# Patient Record
Sex: FEMALE | Race: BLACK OR AFRICAN AMERICAN | NOT HISPANIC OR LATINO | Employment: FULL TIME | URBAN - METROPOLITAN AREA
[De-identification: names, ages, dates, MRNs, and addresses within clinical notes are randomized per-mention and may not be internally consistent; named-entity substitution may affect disease eponyms.]

---

## 2022-02-02 ENCOUNTER — OFFICE VISIT (OUTPATIENT)
Dept: URGENT CARE | Facility: URGENT CARE | Age: 32
End: 2022-02-02
Payer: COMMERCIAL

## 2022-02-02 VITALS
OXYGEN SATURATION: 100 % | TEMPERATURE: 98.3 F | HEART RATE: 77 BPM | DIASTOLIC BLOOD PRESSURE: 79 MMHG | SYSTOLIC BLOOD PRESSURE: 117 MMHG

## 2022-02-02 DIAGNOSIS — L98.9 SKIN LESION: ICD-10-CM

## 2022-02-02 DIAGNOSIS — K64.4 EXTERNAL HEMORRHOIDS: Primary | ICD-10-CM

## 2022-02-02 PROCEDURE — 99203 OFFICE O/P NEW LOW 30 MIN: CPT | Performed by: PHYSICIAN ASSISTANT

## 2022-02-02 RX ORDER — HYDROCORTISONE ACETATE 25 MG/1
25 SUPPOSITORY RECTAL 2 TIMES DAILY PRN
Qty: 12 SUPPOSITORY | Refills: 0 | Status: SHIPPED | OUTPATIENT
Start: 2022-02-02 | End: 2023-05-30

## 2022-02-02 NOTE — PROGRESS NOTES
"Assessment & Plan     1. External hemorrhoids  Along with internal noted on exam.   Will treat with anusol  Encouraged high fiber diet and avoid constipation  If not seeing improvement, advised colorectal consult and referral was placed   - Colorectal Surgery Referral; Future  - hydrocortisone (ANUSOL-HC) 25 MG suppository; Place 1 suppository (25 mg) rectally 2 times daily as needed for hemorrhoids  Dispense: 12 suppository; Refill: 0    2. Skin lesion  Skin lesion for the past 1-2 months, growing in size. Asymptomatic.   Advised follow-up with PCP at gen physical, patient requests derm referral for concrete diagnosis. Referral placed   - Adult Dermatology Referral; Future        Return in about 2 weeks (around 2/16/2022), or if symptoms worsen or fail to improve.    Diagnosis and treatment plan was reviewed with patient and/or family.   We went over any labs or imaging. Discussed worsening symptoms or little to no relief despite treatment plan to follow-up with PCP or return to clinic.  Patient verbalizes understanding. All questions were addressed and answered.     Angelica Canales PA-C  [facility]    CHIEF COMPLAINT:   Chief Complaint   Patient presents with     Urgent Care     Rectal Problem     hemorrhoids since July     Ricarda Bravo is a 31 year old female who presents to clinic today for evaluation of rectal problem. Patient reports that this summer after an episode of constipation, she developed hemorrhoids. Initially, she did notice bright red blood on the toilet paper, but has not seen any recently. Has pain during bowel movements and feels a \"bulge\" down there.     Additionally, since Dec 21, has noticed lightening of her skin of vulva. Asymptomatic without pain, or rash. Area continues to spread. She does not have similar rash on any other part of her body.       No past medical history on file.  No past surgical history on file.  Social History     Tobacco Use "     Smoking status: Not on file     Smokeless tobacco: Not on file   Substance Use Topics     Alcohol use: Not on file     Current Outpatient Medications   Medication     hydrocortisone (ANUSOL-HC) 25 MG suppository     No current facility-administered medications for this visit.     No Known Allergies    10 point ROS of systems were all negative except for pertinent positives noted in my HPI.      Exam:   /79   Pulse 77   Temp 98.3  F (36.8  C) (Tympanic)   SpO2 100%   Breastfeeding No   Constitutional: healthy, alert and no distress  : Labia has a discolored portion on both right and left side. No lesion, vesicles, thickening or excoriation noted.   Rectal: Internal and external hemorrhoid noted with skin tag externally   Neurologic: Speech clear, gait normal. Moves all extremities.

## 2022-02-02 NOTE — PATIENT INSTRUCTIONS
RX to help for the discomfort of hemorrhoids, use as needed   Keep stools soft by having a high fiber diet and using stool softener if constipated  Referral to colorectal specialist if medication does not help for Hemorrhoids  Bring up skin discoloration of vulva at PCP appointment.     Patient Education     Hemorrhoids    Hemorrhoids are swollen and inflamed veins inside the rectum and near the anus. The rectum is the last several inches of the colon. The anus is the passage between the rectum and the outside of the body.   Causes  The veins can become swollen due to increased pressure in them. This is most often caused by:     Chronic constipation or diarrhea    Straining when having a bowel movement    Sitting too long on the toilet    A low-fiber diet    Pregnancy  Symptoms    Bleeding from the rectum. You may notice this after bowel movements.    Lump near the anus    Itching around the anus    Pain around the anus    Mucus leaks from the anus  There are different types of hemorrhoids. Depending on the type you have and the severity, you may be able to treat yourself at home. In some cases, a procedure may be the best treatment option. Your healthcare provider can tell you more about this, if needed.   Home care  General care    To get relief from pain or itching, try:  ? Medicines. Your healthcare provider may recommend stool softeners, suppositories, or laxatives to help manage constipation. Use these exactly as directed.  ? Sitz baths. A sitz bath involves sitting in a few inches of warm bath water. Be careful not to make the water so hot that you burn yourself--test it before sitting in it. Soak for about 10 to 15 minutes a few times a day. This may help relieve pain.  ? Topical products. Your healthcare provider may prescribe or recommend creams, ointments, or pads that can be applied to the hemorrhoid. Use these exactly as directed.  Tips to help prevent hemorrhoids     Eat more fiber. Fiber adds bulk to  stool and absorbs water as it moves through your colon. This makes stool softer and easier to pass.  ? Increase the fiber in your diet with more fiber-rich foods. These include fresh fruit, vegetables, and whole grains.  ? Take a fiber supplement or bulking agent, if advised by your healthcare provider. These include products such as psyllium or methylcellulose.    Drink more water. Your healthcare provider may direct you to drink plenty of water. This can help keep stool soft.    Be more active. Frequent exercise aids digestion and helps prevent constipation. It may also help make bowel movements more regular.    Don t strain during bowel movements. This can make hemorrhoids more likely. Also, don t sit on the toilet for long periods of time.    Follow-up care  Follow up with your healthcare provider as advised. If a culture or imaging tests were done, someone will let you know the results when they are ready. This may take a few days or longer. If your healthcare provider recommends a procedure for your hemorrhoids, these options can be discussed. Options may include surgery and outpatient office treatments.   When to seek medical advice  Call your healthcare provider right away if any of these occur:     Increased bleeding from the rectum    Increased pain around the rectum or anus    Weakness or dizziness  Call 911  Call 911 if any of these occur:     Trouble breathing or swallowing    Fainting or loss of consciousness    Unusually fast heart rate    Vomiting blood    Large amounts of blood in stool or black, tarry stools  Saida last reviewed this educational content on 8/1/2019 2000-2021 [employer]. All rights reserved. This information is not intended as a substitute for professional medical care. Always follow your healthcare professional's instructions.

## 2023-05-28 PROBLEM — O34.211 MATERNAL CARE DUE TO LOW TRANSVERSE UTERINE SCAR FROM PREVIOUS CESAREAN DELIVERY: Status: ACTIVE | Noted: 2019-10-28

## 2023-05-28 PROBLEM — Z97.5 IUD (INTRAUTERINE DEVICE) IN PLACE: Status: ACTIVE | Noted: 2020-01-20

## 2023-05-28 PROBLEM — O34.219 VBAC (VAGINAL BIRTH AFTER CESAREAN): Status: ACTIVE | Noted: 2019-11-14

## 2023-05-30 ENCOUNTER — OFFICE VISIT (OUTPATIENT)
Dept: FAMILY MEDICINE | Facility: CLINIC | Age: 33
End: 2023-05-30
Payer: COMMERCIAL

## 2023-05-30 VITALS
TEMPERATURE: 98.3 F | RESPIRATION RATE: 14 BRPM | BODY MASS INDEX: 27.82 KG/M2 | WEIGHT: 167 LBS | HEART RATE: 81 BPM | SYSTOLIC BLOOD PRESSURE: 116 MMHG | HEIGHT: 65 IN | OXYGEN SATURATION: 99 % | DIASTOLIC BLOOD PRESSURE: 62 MMHG

## 2023-05-30 DIAGNOSIS — D64.9 ANEMIA, UNSPECIFIED TYPE: ICD-10-CM

## 2023-05-30 DIAGNOSIS — Z00.00 ANNUAL PHYSICAL EXAM: Primary | ICD-10-CM

## 2023-05-30 DIAGNOSIS — Z12.4 SCREENING FOR MALIGNANT NEOPLASM OF CERVIX: ICD-10-CM

## 2023-05-30 DIAGNOSIS — Z12.4 CERVICAL CANCER SCREENING: ICD-10-CM

## 2023-05-30 DIAGNOSIS — Z13.220 SCREENING FOR HYPERLIPIDEMIA: ICD-10-CM

## 2023-05-30 LAB
ERYTHROCYTE [DISTWIDTH] IN BLOOD BY AUTOMATED COUNT: 13.9 % (ref 10–15)
HCT VFR BLD AUTO: 34.6 % (ref 35–47)
HGB BLD-MCNC: 11.3 G/DL (ref 11.7–15.7)
MCH RBC QN AUTO: 27.3 PG (ref 26.5–33)
MCHC RBC AUTO-ENTMCNC: 32.7 G/DL (ref 31.5–36.5)
MCV RBC AUTO: 84 FL (ref 78–100)
PLATELET # BLD AUTO: 335 10E3/UL (ref 150–450)
RBC # BLD AUTO: 4.14 10E6/UL (ref 3.8–5.2)
WBC # BLD AUTO: 6.8 10E3/UL (ref 4–11)

## 2023-05-30 PROCEDURE — 87624 HPV HI-RISK TYP POOLED RSLT: CPT | Performed by: PHYSICIAN ASSISTANT

## 2023-05-30 PROCEDURE — 83540 ASSAY OF IRON: CPT | Performed by: PHYSICIAN ASSISTANT

## 2023-05-30 PROCEDURE — 85027 COMPLETE CBC AUTOMATED: CPT | Performed by: PHYSICIAN ASSISTANT

## 2023-05-30 PROCEDURE — 80061 LIPID PANEL: CPT | Performed by: PHYSICIAN ASSISTANT

## 2023-05-30 PROCEDURE — G0145 SCR C/V CYTO,THINLAYER,RESCR: HCPCS | Performed by: PHYSICIAN ASSISTANT

## 2023-05-30 PROCEDURE — 99395 PREV VISIT EST AGE 18-39: CPT | Performed by: PHYSICIAN ASSISTANT

## 2023-05-30 PROCEDURE — 82728 ASSAY OF FERRITIN: CPT | Performed by: PHYSICIAN ASSISTANT

## 2023-05-30 PROCEDURE — 83550 IRON BINDING TEST: CPT | Performed by: PHYSICIAN ASSISTANT

## 2023-05-30 PROCEDURE — 36415 COLL VENOUS BLD VENIPUNCTURE: CPT | Performed by: PHYSICIAN ASSISTANT

## 2023-05-30 PROCEDURE — 80053 COMPREHEN METABOLIC PANEL: CPT | Performed by: PHYSICIAN ASSISTANT

## 2023-05-30 ASSESSMENT — ENCOUNTER SYMPTOMS
NAUSEA: 0
HEMATOCHEZIA: 0
SHORTNESS OF BREATH: 0
ABDOMINAL PAIN: 0
PARESTHESIAS: 0
MYALGIAS: 0
FEVER: 0
COUGH: 0
JOINT SWELLING: 0
DIZZINESS: 0
CONSTIPATION: 0
FREQUENCY: 0
WEAKNESS: 0
SORE THROAT: 0
BREAST MASS: 0
EYE PAIN: 0
CHILLS: 0
NERVOUS/ANXIOUS: 0
DYSURIA: 0
HEARTBURN: 0
HEMATURIA: 0
PALPITATIONS: 0
HEADACHES: 0
ARTHRALGIAS: 0
DIARRHEA: 0

## 2023-05-30 NOTE — PROGRESS NOTES
SUBJECTIVE:   CC: Lawrence is an 32 year old who presents for preventive health visit.       5/30/2023     4:12 PM   Additional Questions   Roomed by Evelyne Issa   Accompanied by none     Patient has been advised of split billing requirements and indicates understanding: Yes  Patient is a pleasant 32-year-old female who presents to the clinic today for annual physical.  No significant past medical history.  She is not on chronic daily medication.  No concerns about anxiety or depression.  She is not a current smoker.  No excessive alcohol use.  She is staying active.    Healthy Habits:     Getting at least 3 servings of Calcium per day:  Yes    Bi-annual eye exam:  NO    Dental care twice a year:  Yes    Sleep apnea or symptoms of sleep apnea:  None    Diet:  Other    Frequency of exercise:  6-7 days/week    Duration of exercise:  30-45 minutes    Taking medications regularly:  Not Applicable    Medication side effects:  Not applicable    PHQ-2 Total Score: 0    Additional concerns today:  No      Today's PHQ-2 Score:       5/30/2023     4:15 PM   PHQ-2 ( 1999 [employer])   Q1: Little interest or pleasure in doing things 0   Q2: Feeling down, depressed or hopeless 0   PHQ-2 Score 0   Q1: Little interest or pleasure in doing things Not at all   Q2: Feeling down, depressed or hopeless Not at all   PHQ-2 Score 0       Have you ever done Advance Care Planning? (For example, a Health Directive, POLST, or a discussion with a medical provider or your loved ones about your wishes): No, advance care planning information given to patient to review.  Patient declined advance care planning discussion at this time.    Social History     Tobacco Use     Smoking status: Never     Passive exposure: Never     Smokeless tobacco: Never   Vaping Use     Vaping status: Never Used   Substance Use Topics     Alcohol use: Not on file             5/30/2023     4:10 PM   Alcohol Use   Prescreen: >3 drinks/day or >7 drinks/week? Not  Applicable     Reviewed orders with patient.  Reviewed health maintenance and updated orders accordingly - Yes  Lab work is in process    Breast Cancer Screenin/30/2023     4:15 PM   Breast CA Risk Assessment (FHS-7)   Do you have a family history of breast, colon, or ovarian cancer? No / Unknown       click delete button to remove this line now  Patient under 40 years of age: Routine Mammogram Screening not recommended.   Pertinent mammograms are reviewed under the imaging tab.    History of abnormal Pap smear: NO - age 30-65 PAP every 5 years with negative HPV co-testing recommended     Reviewed and updated as needed this visit by clinical staff   Tobacco  Allergies  Meds              Reviewed and updated as needed this visit by Provider                 No past medical history on file.   No past surgical history on file.  OB History   No obstetric history on file.       Review of Systems   Constitutional: Negative for chills and fever.   HENT: Negative for congestion, ear pain, hearing loss and sore throat.    Eyes: Negative for pain and visual disturbance.   Respiratory: Negative for cough and shortness of breath.    Cardiovascular: Negative for chest pain, palpitations and peripheral edema.   Gastrointestinal: Negative for abdominal pain, constipation, diarrhea, heartburn, hematochezia and nausea.   Breasts:  Negative for tenderness, breast mass and discharge.   Genitourinary: Negative for dysuria, frequency, genital sores, hematuria, pelvic pain, urgency, vaginal bleeding and vaginal discharge.   Musculoskeletal: Negative for arthralgias, joint swelling and myalgias.   Skin: Negative for rash.   Neurological: Negative for dizziness, weakness, headaches and paresthesias.   Psychiatric/Behavioral: Negative for mood changes. The patient is not nervous/anxious.         OBJECTIVE:   /62 (BP Location: Right arm, Patient Position: Sitting, Cuff Size: Adult Regular)   Pulse 81   Temp 98.3  F (36.8  " C) (Oral)   Resp 14   Ht 1.653 m (5' 5.06\")   Wt 75.8 kg (167 lb)   LMP 05/16/2023 (Approximate)   SpO2 99%   BMI 27.74 kg/m    Physical Exam  Vitals and nursing note reviewed. Exam conducted with a chaperone present.   Constitutional:       General: She is not in acute distress.     Appearance: Normal appearance. She is well-developed and well-groomed. She is not ill-appearing or toxic-appearing.   HENT:      Head: Normocephalic and atraumatic.      Right Ear: Tympanic membrane, ear canal and external ear normal.      Left Ear: Tympanic membrane, ear canal and external ear normal.      Mouth/Throat:      Lips: Pink.      Mouth: Mucous membranes are moist.      Palate: No mass.      Pharynx: Oropharynx is clear. Uvula midline.      Tonsils: No tonsillar exudate or tonsillar abscesses.   Eyes:      General: Lids are normal.         Right eye: No discharge.         Left eye: No discharge.   Neck:      Trachea: Trachea normal.   Cardiovascular:      Rate and Rhythm: Normal rate and regular rhythm.      Heart sounds: S1 normal and S2 normal. No murmur heard.  Pulmonary:      Effort: Pulmonary effort is normal.      Breath sounds: Normal breath sounds and air entry.   Abdominal:      General: Bowel sounds are normal. There is no distension.      Palpations: Abdomen is soft.      Tenderness: There is no abdominal tenderness. There is no guarding or rebound.   Genitourinary:     Vagina: Normal.      Cervix: Normal.      Uterus: Normal.       Adnexa: Right adnexa normal and left adnexa normal.      Comments: Exam chaperoned by Evelyne SAAB  Musculoskeletal:      Cervical back: Full passive range of motion without pain and neck supple.      Right lower leg: No edema.      Left lower leg: No edema.   Lymphadenopathy:      Cervical: No cervical adenopathy.   Skin:     General: Skin is warm and dry.      Findings: No lesion or rash.   Neurological:      General: No focal deficit present.      Mental Status: She is alert.    " "  Cranial Nerves: No cranial nerve deficit.      Gait: Gait is intact.      Deep Tendon Reflexes:      Reflex Scores:       Patellar reflexes are 2+ on the right side and 2+ on the left side.  Psychiatric:         Attention and Perception: Attention normal.         Mood and Affect: Mood normal.         Speech: Speech normal.           ASSESSMENT/PLAN:       ICD-10-CM    1. Annual physical exam  Z00.00 CBC with platelets     Comprehensive metabolic panel (BMP + Alb, Alk Phos, ALT, AST, Total. Bili, TP)     Lipid panel reflex to direct LDL Fasting     CBC with platelets     Comprehensive metabolic panel (BMP + Alb, Alk Phos, ALT, AST, Total. Bili, TP)     Lipid panel reflex to direct LDL Fasting      2. Cervical cancer screening  Z12.4       3. Screening for malignant neoplasm of cervix  Z12.4 Gynecologic Cytology (PAP Smear)     Gynecologic Cytology (PAP Smear)      4. Screening for hyperlipidemia  Z13.220       5. Anemia, unspecified type  D64.9 Iron and iron binding capacity     Ferritin        #1 annual physical  Update screening labs including a CBC, complete metabolic panel, lipid    #2 screening for hyperlipidemia  We will check a lipid panel today    #3 screening for cervical cancer   Pap updated today    Patient has been advised of split billing requirements and indicates understanding: Yes      COUNSELING:  Reviewed preventive health counseling, as reflected in patient instructions       Regular exercise       Healthy diet/nutrition       Vision screening      BMI:   Estimated body mass index is 27.74 kg/m  as calculated from the following:    Height as of this encounter: 1.653 m (5' 5.06\").    Weight as of this encounter: 75.8 kg (167 lb).   Weight management plan: Discussed healthy diet and exercise guidelines      She reports that she has never smoked. She has never been exposed to tobacco smoke. She has never used smokeless tobacco.            Jonathan Hart PA-C  [facility] " SARAH

## 2023-05-31 LAB
ALBUMIN SERPL BCG-MCNC: 4.1 G/DL (ref 3.5–5.2)
ALP SERPL-CCNC: 55 U/L (ref 35–104)
ALT SERPL W P-5'-P-CCNC: 18 U/L (ref 10–35)
ANION GAP SERPL CALCULATED.3IONS-SCNC: 12 MMOL/L (ref 7–15)
AST SERPL W P-5'-P-CCNC: 23 U/L (ref 10–35)
BILIRUB SERPL-MCNC: 0.6 MG/DL
BUN SERPL-MCNC: 12 MG/DL (ref 6–20)
CALCIUM SERPL-MCNC: 9 MG/DL (ref 8.6–10)
CHLORIDE SERPL-SCNC: 107 MMOL/L (ref 98–107)
CHOLEST SERPL-MCNC: 164 MG/DL
CREAT SERPL-MCNC: 0.59 MG/DL (ref 0.51–0.95)
DEPRECATED HCO3 PLAS-SCNC: 20 MMOL/L (ref 22–29)
GFR SERPL CREATININE-BSD FRML MDRD: >90 ML/MIN/1.73M2
GLUCOSE SERPL-MCNC: 81 MG/DL (ref 70–99)
HDLC SERPL-MCNC: 58 MG/DL
LDLC SERPL CALC-MCNC: 95 MG/DL
NONHDLC SERPL-MCNC: 106 MG/DL
POTASSIUM SERPL-SCNC: 3.9 MMOL/L (ref 3.4–5.3)
PROT SERPL-MCNC: 7.8 G/DL (ref 6.4–8.3)
SODIUM SERPL-SCNC: 139 MMOL/L (ref 136–145)
TRIGL SERPL-MCNC: 57 MG/DL

## 2023-06-01 LAB
FERRITIN SERPL-MCNC: 11 NG/ML (ref 6–175)
IRON BINDING CAPACITY (ROCHE): 331 UG/DL (ref 240–430)
IRON SATN MFR SERPL: 14 % (ref 15–46)
IRON SERPL-MCNC: 45 UG/DL (ref 37–145)

## 2023-06-02 LAB
BKR LAB AP GYN ADEQUACY: NORMAL
BKR LAB AP GYN INTERPRETATION: NORMAL
BKR LAB AP GYN OTHER FINDINGS: NORMAL
BKR LAB AP HPV REFLEX: NORMAL
BKR LAB AP LMP: NORMAL
BKR LAB AP PREVIOUS ABNORMAL: NORMAL
PATH REPORT.COMMENTS IMP SPEC: NORMAL
PATH REPORT.COMMENTS IMP SPEC: NORMAL
PATH REPORT.RELEVANT HX SPEC: NORMAL

## 2023-06-05 LAB
HUMAN PAPILLOMA VIRUS 16 DNA: NEGATIVE
HUMAN PAPILLOMA VIRUS 18 DNA: NEGATIVE
HUMAN PAPILLOMA VIRUS FINAL DIAGNOSIS: ABNORMAL
HUMAN PAPILLOMA VIRUS OTHER HR: POSITIVE

## 2023-06-06 ENCOUNTER — PATIENT OUTREACH (OUTPATIENT)
Dept: FAMILY MEDICINE | Facility: CLINIC | Age: 33
End: 2023-06-06
Payer: COMMERCIAL

## 2023-06-06 ENCOUNTER — TELEPHONE (OUTPATIENT)
Dept: FAMILY MEDICINE | Facility: CLINIC | Age: 33
End: 2023-06-06
Payer: COMMERCIAL

## 2023-06-06 NOTE — TELEPHONE ENCOUNTER
Pt called back, relayed message ands set up additional lab appt.    Pt would like results mailed to her also for these labs and then the next ones she is having done.

## 2023-06-06 NOTE — TELEPHONE ENCOUNTER
----- Message from Jonathan Hart PA-C sent at 6/6/2023  5:00 PM CDT -----  Please call patient and let her know that her Pap did show a normal yeast.  Please ask if she is having any pelvic pain or abnormal discharge.  If she is she may need to have her IUD removed/changed.    -Stephane  ----- Message -----  From: Ilsa Harris RN  Sent: 6/6/2023   6:52 AM CDT  To: JOSELUIS Villarreal,   05/30/23 NIL Pap, +HR HPV (not 16 or 18) Plan cotest in 1 year due 05/30/24. RN to call pt and inform. Actinomyces spp was seen on the pap. Pap team does not manage incidentals. If you are wanting further follow up regarding the Actinomyces spp please send to your care team staff.

## 2023-06-06 NOTE — TELEPHONE ENCOUNTER
----- Message from Jonathan Hart PA-C sent at 6/6/2023  6:29 AM CDT -----  Please call pt with labs    She is a little anemia. Iron labs were normal. I would like to check her B12 level is she should make a lab only appt for this.     Rest of her labs are stable.

## 2023-06-06 NOTE — TELEPHONE ENCOUNTER
Left message to call back for: Magn[employer]  Information to relay to patient: please relay message below.

## 2023-06-06 NOTE — TELEPHONE ENCOUNTER
Called and LMTCB. Instructed pt to call back to receive lab results. Ok to relay results per Stephane Hart upon rtn call.    Evelyne Issa MA on 6/6/2023 at 9:56 AM

## 2023-06-06 NOTE — LETTER
June 6, 2023      Lawrence Crooks  [address]  [place] [address]        Dear ,    We are writing to inform you of your test results, as requested.     Test results indicate you may require additional follow up, see comment below.    You are a little anemic. Iron labs are normal. Stephane Hart wants to check your B12 level which you are not scheduled for.     Resulted Orders   CBC with platelets   Result Value Ref Range    WBC Count 6.8 4.0 - 11.0 10e3/uL    RBC Count 4.14 3.80 - 5.20 10e6/uL    Hemoglobin 11.3 (L) 11.7 - 15.7 g/dL    Hematocrit 34.6 (L) 35.0 - 47.0 %    MCV 84 78 - 100 fL    MCH 27.3 26.5 - 33.0 pg    MCHC 32.7 31.5 - 36.5 g/dL    RDW 13.9 10.0 - 15.0 %    Platelet Count 335 150 - 450 10e3/uL   Comprehensive metabolic panel (BMP + Alb, Alk Phos, ALT, AST, Total. Bili, TP)   Result Value Ref Range    Sodium 139 136 - 145 mmol/L    Potassium 3.9 3.4 - 5.3 mmol/L    Chloride 107 98 - 107 mmol/L    Carbon Dioxide (CO2) 20 (L) 22 - 29 mmol/L    Anion Gap 12 7 - 15 mmol/L    Urea Nitrogen 12.0 6.0 - 20.0 mg/dL    Creatinine 0.59 0.51 - 0.95 mg/dL    Calcium 9.0 8.6 - 10.0 mg/dL    Glucose 81 70 - 99 mg/dL    Alkaline Phosphatase 55 35 - 104 U/L    AST 23 10 - 35 U/L    ALT 18 10 - 35 U/L    Protein Total 7.8 6.4 - 8.3 g/dL    Albumin 4.1 3.5 - 5.2 g/dL    Bilirubin Total 0.6 <=1.2 mg/dL    GFR Estimate >90 >60 mL/min/1.73m2      Comment:      eGFR calculated using 2021 CKD-EPI equation.   Lipid panel reflex to direct LDL Fasting   Result Value Ref Range    Cholesterol 164 <200 mg/dL    Triglycerides 57 <150 mg/dL    Direct Measure HDL 58 >=50 mg/dL    LDL Cholesterol Calculated 95 <=100 mg/dL    Non HDL Cholesterol 106 <130 mg/dL    Narrative    Cholesterol  Desirable:  <200 mg/dL    Triglycerides  Normal:  Less than 150 mg/dL  Borderline High:  150-199 mg/dL  High:  200-499 mg/dL  Very High:  Greater than or equal to 500 mg/dL    Direct Measure HDL  Female:  Greater than or  equal to 50 mg/dL   Male:  Greater than or equal to 40 mg/dL    LDL Cholesterol  Desirable:  <100mg/dL  Above Desirable:  100-129 mg/dL   Borderline High:  130-159 mg/dL   High:  160-189 mg/dL   Very High:  >= 190 mg/dL    Non HDL Cholesterol  Desirable:  130 mg/dL  Above Desirable:  130-159 mg/dL  Borderline High:  160-189 mg/dL  High:  190-219 mg/dL  Very High:  Greater than or equal to 220 mg/dL   Iron and iron binding capacity   Result Value Ref Range    Iron 45 37 - 145 ug/dL    Iron Binding Capacity 331 240 - 430 ug/dL    Iron Sat Index 14 (L) 15 - 46 %   Ferritin   Result Value Ref Range    Ferritin 11 6 - 175 ng/mL       If you have any questions or concerns, please call the clinic at the number listed above.       Sincerely,        [facility]  Phone [phone]  Fax [phone]

## 2023-06-07 NOTE — TELEPHONE ENCOUNTER
Pt notified. No symptoms at this time. She would like a copy left at the  for to . PAP smear and HPV letter created 6/6/23 but pap team, left at  for .

## 2023-06-08 ENCOUNTER — LAB (OUTPATIENT)
Dept: LAB | Facility: CLINIC | Age: 33
End: 2023-06-08
Payer: COMMERCIAL

## 2023-06-08 DIAGNOSIS — D64.9 ANEMIA, UNSPECIFIED TYPE: ICD-10-CM

## 2023-06-08 PROCEDURE — 85045 AUTOMATED RETICULOCYTE COUNT: CPT

## 2023-06-08 PROCEDURE — 36415 COLL VENOUS BLD VENIPUNCTURE: CPT

## 2023-06-08 PROCEDURE — 82607 VITAMIN B-12: CPT

## 2023-06-09 LAB
RETICS # AUTO: 0.07 10E6/UL (ref 0.01–0.11)
RETICS/RBC NFR AUTO: 1.7 % (ref 0.8–2.7)
VIT B12 SERPL-MCNC: 865 PG/ML (ref 232–1245)

## 2023-06-14 ENCOUNTER — TELEPHONE (OUTPATIENT)
Dept: FAMILY MEDICINE | Facility: CLINIC | Age: 33
End: 2023-06-14
Payer: COMMERCIAL

## 2023-06-14 ENCOUNTER — TELEPHONE (OUTPATIENT)
Dept: NURSING | Facility: CLINIC | Age: 33
End: 2023-06-14
Payer: COMMERCIAL

## 2023-06-14 NOTE — TELEPHONE ENCOUNTER
Patient calling with questions about positive HPV results. Patient is asking if there is a medication she can take. Relayed the following information that was review with patient by Rachel Harris RN on 6/6/23:       There is currently no actual curative treatment for HPV;  but the body's immune system can clear the infection.      How to Prevent HPV and other STIs (sexually transmitted infections) and their complications:     *   Using condoms can reduce your risk of getting and transmitting STIs.     *   Have just one sexual partner who is not sexually active with anyone else.     *   Avoid smoking. Studies show that smoking increases the risk of becky HPV.     *   Follow up with your provider as recommended, including follow up pap smears and pelvic exams.     *  Ways to boost the immune system: regular exercise, managing stress, eating a healthy diet, adequate rest, and daily multivitamin use.      Patient verbalized understanding and had no further questions. Will follow up with lab work in 1 year.    Porsha Nichols RN  06/14/23 2:58 PM  [facility] Nurse Advisor

## 2023-06-14 NOTE — LETTER
June 14, 2023      Lawrence Crooks  [address]   [place] [address]        Dear Lawrence,         Dear MsBibiValeriy,     We are writing to inform you of your test results, as requested.      Test results indicate you may require additional follow up, see comment below.     You are a little anemic. Iron labs are normal. Stephane Hart wants to check your B12 level which you are not scheduled for.              Resulted Orders   CBC with platelets   Result Value Ref Range     WBC Count 6.8 4.0 - 11.0 10e3/uL     RBC Count 4.14 3.80 - 5.20 10e6/uL     Hemoglobin 11.3 (L) 11.7 - 15.7 g/dL     Hematocrit 34.6 (L) 35.0 - 47.0 %     MCV 84 78 - 100 fL     MCH 27.3 26.5 - 33.0 pg     MCHC 32.7 31.5 - 36.5 g/dL     RDW 13.9 10.0 - 15.0 %     Platelet Count 335 150 - 450 10e3/uL   Comprehensive metabolic panel (BMP + Alb, Alk Phos, ALT, AST, Total. Bili, TP)   Result Value Ref Range     Sodium 139 136 - 145 mmol/L     Potassium 3.9 3.4 - 5.3 mmol/L     Chloride 107 98 - 107 mmol/L     Carbon Dioxide (CO2) 20 (L) 22 - 29 mmol/L     Anion Gap 12 7 - 15 mmol/L     Urea Nitrogen 12.0 6.0 - 20.0 mg/dL     Creatinine 0.59 0.51 - 0.95 mg/dL     Calcium 9.0 8.6 - 10.0 mg/dL     Glucose 81 70 - 99 mg/dL     Alkaline Phosphatase 55 35 - 104 U/L     AST 23 10 - 35 U/L     ALT 18 10 - 35 U/L     Protein Total 7.8 6.4 - 8.3 g/dL     Albumin 4.1 3.5 - 5.2 g/dL     Bilirubin Total 0.6 <=1.2 mg/dL     GFR Estimate >90 >60 mL/min/1.73m2         Comment:         eGFR calculated using 2021 CKD-EPI equation.   Lipid panel reflex to direct LDL Fasting   Result Value Ref Range     Cholesterol 164 <200 mg/dL     Triglycerides 57 <150 mg/dL     Direct Measure HDL 58 >=50 mg/dL     LDL Cholesterol Calculated 95 <=100 mg/dL     Non HDL Cholesterol 106 <130 mg/dL     Narrative     Cholesterol  Desirable:  <200 mg/dL     Triglycerides  Normal:  Less than 150 mg/dL  Borderline High:  150-199 mg/dL  High:  200-499 mg/dL  Very  High:  Greater than or equal to 500 mg/dL     Direct Measure HDL  Female:  Greater than or equal to 50 mg/dL   Male:  Greater than or equal to 40 mg/dL     LDL Cholesterol  Desirable:  <100mg/dL  Above Desirable:  100-129 mg/dL   Borderline High:  130-159 mg/dL   High:  160-189 mg/dL   Very High:  >= 190 mg/dL     Non HDL Cholesterol  Desirable:  130 mg/dL  Above Desirable:  130-159 mg/dL  Borderline High:  160-189 mg/dL  High:  190-219 mg/dL  Very High:  Greater than or equal to 220 mg/dL   Iron and iron binding capacity   Result Value Ref Range     Iron 45 37 - 145 ug/dL     Iron Binding Capacity 331 240 - 430 ug/dL     Iron Sat Index 14 (L) 15 - 46 %   Ferritin   Result Value Ref Range     Ferritin 11 6 - 175 ng/mL         If you have any questions or concerns, please call the clinic at the number listed above.         Sincerely,           [facility]  Phone [phone]  Fax [phone]

## 2023-06-14 NOTE — TELEPHONE ENCOUNTER
Letter sent back in the mail from 6/6/23. Created new letter. Mailed again. Pt confirmed correct address

## 2023-10-29 ENCOUNTER — APPOINTMENT (OUTPATIENT)
Dept: ULTRASOUND IMAGING | Facility: CLINIC | Age: 33
End: 2023-10-29
Attending: EMERGENCY MEDICINE
Payer: COMMERCIAL

## 2023-10-29 ENCOUNTER — HOSPITAL ENCOUNTER (EMERGENCY)
Facility: CLINIC | Age: 33
Discharge: HOME | End: 2023-10-29
Attending: EMERGENCY MEDICINE | Admitting: EMERGENCY MEDICINE
Payer: COMMERCIAL

## 2023-10-29 VITALS
HEART RATE: 68 BPM | TEMPERATURE: 97.6 F | OXYGEN SATURATION: 100 % | RESPIRATION RATE: 16 BRPM | SYSTOLIC BLOOD PRESSURE: 105 MMHG | DIASTOLIC BLOOD PRESSURE: 64 MMHG | HEIGHT: 65 IN | BODY MASS INDEX: 28.49 KG/M2 | WEIGHT: 171 LBS

## 2023-10-29 DIAGNOSIS — R10.9 ABDOMINAL PAIN DURING PREGNANCY IN FIRST TRIMESTER: ICD-10-CM

## 2023-10-29 DIAGNOSIS — O26.891 ABDOMINAL PAIN DURING PREGNANCY IN FIRST TRIMESTER: ICD-10-CM

## 2023-10-29 DIAGNOSIS — O21.9 NAUSEA AND VOMITING IN PREGNANCY: ICD-10-CM

## 2023-10-29 LAB
ABO/RH(D): NORMAL
ALBUMIN SERPL BCG-MCNC: 4 G/DL (ref 3.5–5.2)
ALBUMIN UR-MCNC: NEGATIVE MG/DL
ALP SERPL-CCNC: 63 U/L (ref 35–104)
ALT SERPL W P-5'-P-CCNC: 41 U/L (ref 0–50)
ANION GAP SERPL CALCULATED.3IONS-SCNC: 9 MMOL/L (ref 7–15)
ANTIBODY SCREEN: NEGATIVE
APPEARANCE UR: CLEAR
AST SERPL W P-5'-P-CCNC: 31 U/L (ref 0–45)
BASOPHILS # BLD AUTO: 0 10E3/UL (ref 0–0.2)
BASOPHILS NFR BLD AUTO: 1 %
BILIRUB SERPL-MCNC: 0.3 MG/DL
BILIRUB UR QL STRIP: NEGATIVE
BUN SERPL-MCNC: 3.7 MG/DL (ref 6–20)
CALCIUM SERPL-MCNC: 9.5 MG/DL (ref 8.6–10)
CHLORIDE SERPL-SCNC: 105 MMOL/L (ref 98–107)
COLOR UR AUTO: ABNORMAL
CREAT SERPL-MCNC: 0.5 MG/DL (ref 0.51–0.95)
DEPRECATED HCO3 PLAS-SCNC: 21 MMOL/L (ref 22–29)
EGFRCR SERPLBLD CKD-EPI 2021: >90 ML/MIN/1.73M2
EOSINOPHIL # BLD AUTO: 0.1 10E3/UL (ref 0–0.7)
EOSINOPHIL NFR BLD AUTO: 2 %
ERYTHROCYTE [DISTWIDTH] IN BLOOD BY AUTOMATED COUNT: 14.5 % (ref 10–15)
GLUCOSE SERPL-MCNC: 86 MG/DL (ref 70–99)
GLUCOSE UR STRIP-MCNC: NEGATIVE MG/DL
HCG INTACT+B SERPL-ACNC: ABNORMAL MIU/ML
HCT VFR BLD AUTO: 35.1 % (ref 35–47)
HGB BLD-MCNC: 11.4 G/DL (ref 11.7–15.7)
HGB UR QL STRIP: NEGATIVE
IMM GRANULOCYTES # BLD: 0 10E3/UL
IMM GRANULOCYTES NFR BLD: 0 %
KETONES UR STRIP-MCNC: NEGATIVE MG/DL
LEUKOCYTE ESTERASE UR QL STRIP: ABNORMAL
LIPASE SERPL-CCNC: 33 U/L (ref 13–60)
LYMPHOCYTES # BLD AUTO: 2 10E3/UL (ref 0.8–5.3)
LYMPHOCYTES NFR BLD AUTO: 30 %
MCH RBC QN AUTO: 27.3 PG (ref 26.5–33)
MCHC RBC AUTO-ENTMCNC: 32.5 G/DL (ref 31.5–36.5)
MCV RBC AUTO: 84 FL (ref 78–100)
MONOCYTES # BLD AUTO: 0.6 10E3/UL (ref 0–1.3)
MONOCYTES NFR BLD AUTO: 9 %
MUCOUS THREADS #/AREA URNS LPF: PRESENT /LPF
NEUTROPHILS # BLD AUTO: 4 10E3/UL (ref 1.6–8.3)
NEUTROPHILS NFR BLD AUTO: 58 %
NITRATE UR QL: NEGATIVE
NRBC # BLD AUTO: 0 10E3/UL
NRBC BLD AUTO-RTO: 0 /100
PH UR STRIP: 6 [PH] (ref 5–7)
PLATELET # BLD AUTO: 420 10E3/UL (ref 150–450)
POTASSIUM SERPL-SCNC: 4.2 MMOL/L (ref 3.4–5.3)
PROT SERPL-MCNC: 7.6 G/DL (ref 6.4–8.3)
RBC # BLD AUTO: 4.18 10E6/UL (ref 3.8–5.2)
RBC URINE: <1 /HPF
SODIUM SERPL-SCNC: 135 MMOL/L (ref 135–145)
SP GR UR STRIP: 1.01 (ref 1–1.03)
SPECIMEN EXPIRATION DATE: NORMAL
SQUAMOUS EPITHELIAL: 3 /HPF
UROBILINOGEN UR STRIP-MCNC: <2 MG/DL
WBC # BLD AUTO: 6.7 10E3/UL (ref 4–11)
WBC URINE: 1 /HPF

## 2023-10-29 PROCEDURE — 96361 HYDRATE IV INFUSION ADD-ON: CPT

## 2023-10-29 PROCEDURE — 83690 ASSAY OF LIPASE: CPT | Performed by: EMERGENCY MEDICINE

## 2023-10-29 PROCEDURE — 250N000011 HC RX IP 250 OP 636: Performed by: EMERGENCY MEDICINE

## 2023-10-29 PROCEDURE — 96374 THER/PROPH/DIAG INJ IV PUSH: CPT

## 2023-10-29 PROCEDURE — 80053 COMPREHEN METABOLIC PANEL: CPT | Performed by: EMERGENCY MEDICINE

## 2023-10-29 PROCEDURE — 96375 TX/PRO/DX INJ NEW DRUG ADDON: CPT

## 2023-10-29 PROCEDURE — 76801 OB US < 14 WKS SINGLE FETUS: CPT

## 2023-10-29 PROCEDURE — 86850 RBC ANTIBODY SCREEN: CPT | Performed by: EMERGENCY MEDICINE

## 2023-10-29 PROCEDURE — 250N000013 HC RX MED GY IP 250 OP 250 PS 637: Performed by: EMERGENCY MEDICINE

## 2023-10-29 PROCEDURE — 86901 BLOOD TYPING SEROLOGIC RH(D): CPT | Performed by: EMERGENCY MEDICINE

## 2023-10-29 PROCEDURE — 99285 EMERGENCY DEPT VISIT HI MDM: CPT | Mod: 25

## 2023-10-29 PROCEDURE — 84702 CHORIONIC GONADOTROPIN TEST: CPT | Performed by: EMERGENCY MEDICINE

## 2023-10-29 PROCEDURE — 81003 URINALYSIS AUTO W/O SCOPE: CPT | Performed by: EMERGENCY MEDICINE

## 2023-10-29 PROCEDURE — 258N000003 HC RX IP 258 OP 636: Performed by: EMERGENCY MEDICINE

## 2023-10-29 PROCEDURE — 36415 COLL VENOUS BLD VENIPUNCTURE: CPT | Performed by: EMERGENCY MEDICINE

## 2023-10-29 PROCEDURE — 85025 COMPLETE CBC W/AUTO DIFF WBC: CPT | Performed by: EMERGENCY MEDICINE

## 2023-10-29 RX ORDER — ALUMINA, MAGNESIA, AND SIMETHICONE 2400; 2400; 240 MG/30ML; MG/30ML; MG/30ML
30 SUSPENSION ORAL EVERY 4 HOURS PRN
Qty: 355 ML | Refills: 0 | Status: SHIPPED | OUTPATIENT
Start: 2023-10-29 | End: 2023-12-12

## 2023-10-29 RX ORDER — PYRIDOXINE HCL (VITAMIN B6) 25 MG
25 TABLET ORAL 4 TIMES DAILY PRN
Qty: 30 TABLET | Refills: 0 | Status: SHIPPED | OUTPATIENT
Start: 2023-10-29 | End: 2023-12-12

## 2023-10-29 RX ORDER — PRENATAL VIT/IRON FUM/FOLIC AC 27MG-0.8MG
1 TABLET ORAL DAILY
Qty: 90 TABLET | Refills: 3 | Status: SHIPPED | OUTPATIENT
Start: 2023-10-29

## 2023-10-29 RX ORDER — ACETAMINOPHEN 500 MG
1000 TABLET ORAL ONCE
Status: COMPLETED | OUTPATIENT
Start: 2023-10-29 | End: 2023-10-29

## 2023-10-29 RX ORDER — ONDANSETRON 2 MG/ML
4 INJECTION INTRAMUSCULAR; INTRAVENOUS ONCE
Status: COMPLETED | OUTPATIENT
Start: 2023-10-29 | End: 2023-10-29

## 2023-10-29 RX ORDER — MAGNESIUM HYDROXIDE/ALUMINUM HYDROXICE/SIMETHICONE 120; 1200; 1200 MG/30ML; MG/30ML; MG/30ML
15 SUSPENSION ORAL ONCE
Status: COMPLETED | OUTPATIENT
Start: 2023-10-29 | End: 2023-10-29

## 2023-10-29 RX ORDER — PYRIDOXINE HCL (VITAMIN B6) 25 MG
25 TABLET ORAL ONCE
Status: COMPLETED | OUTPATIENT
Start: 2023-10-29 | End: 2023-10-29

## 2023-10-29 RX ORDER — HYDROMORPHONE HYDROCHLORIDE 1 MG/ML
0.5 INJECTION, SOLUTION INTRAMUSCULAR; INTRAVENOUS; SUBCUTANEOUS ONCE
Status: COMPLETED | OUTPATIENT
Start: 2023-10-29 | End: 2023-10-29

## 2023-10-29 RX ADMIN — HYDROMORPHONE HYDROCHLORIDE 0.5 MG: 1 INJECTION, SOLUTION INTRAMUSCULAR; INTRAVENOUS; SUBCUTANEOUS at 14:51

## 2023-10-29 RX ADMIN — ONDANSETRON 4 MG: 2 INJECTION INTRAMUSCULAR; INTRAVENOUS at 14:47

## 2023-10-29 RX ADMIN — ACETAMINOPHEN 1000 MG: 500 TABLET ORAL at 11:59

## 2023-10-29 RX ADMIN — Medication 25 MG: at 11:59

## 2023-10-29 RX ADMIN — SODIUM CHLORIDE 1000 ML: 9 INJECTION, SOLUTION INTRAVENOUS at 11:07

## 2023-10-29 RX ADMIN — ALUMINUM HYDROXIDE, MAGNESIUM HYDROXIDE, AND DIMETHICONE 15 ML: 200; 20; 200 SUSPENSION ORAL at 11:59

## 2023-10-29 RX ADMIN — DOXYLAMINE SUCCINATE 25 MG: 25 TABLET ORAL at 11:59

## 2023-10-29 ASSESSMENT — ACTIVITIES OF DAILY LIVING (ADL)
ADLS_ACUITY_SCORE: 35
ADLS_ACUITY_SCORE: 35
ADLS_ACUITY_SCORE: 33
ADLS_ACUITY_SCORE: 35

## 2023-10-29 NOTE — ED TRIAGE NOTES
Pt here with diffuse abdominal pain she has had for about a week. Last night she said it was 10/10 and unable to sleep. Pt last bm one hour ago and she states it was normal. When asked about pregnancy she said she had a positive home pregnancy test on October 10th. States LMP was Sept.2, 2023.

## 2023-10-29 NOTE — DISCHARGE INSTRUCTIONS
Follow-up with [facility] as soon as possible to establish care for your pregnancy.  Take the prenatal vitamins daily as prescribed.  Can take the Unisom and vitamin B6 for the nausea.  Can take Tylenol for pain in pregnancy.  Do not take ibuprofen or the prior prescribed oxycodone as this is not recommended in pregnancy.  Can also take the Maalox for acid reflux with pregnancy.    Return to the ER if you develop fever, repetitive vomiting, severe worsening abdominal pain, right lower quadrant abdominal pain, vaginal bleeding or vaginal discharge, chest pain, shortness of breath, or any new or worsening concerns

## 2023-10-29 NOTE — ED PROVIDER NOTES
EMERGENCY DEPARTMENT ENCOUNTER      NAME: Lawrence Crooks  AGE: 33 year old female  YOB: 1990  MRN: [id number]  EVALUATION DATE & TIME: No admission date for patient encounter.    PCP: No Ref-Primary, Physician    ED PROVIDER: Alix Pimentel MD      Chief Complaint   Patient presents with    Abdominal Pain         FINAL IMPRESSION:  1. Abdominal pain during pregnancy in first trimester    2. Nausea and vomiting in pregnancy          ED COURSE & MEDICAL DECISION MAKING:    Pertinent Labs & Imaging studies reviewed. (See chart for details)    10:50 AM I introduced myself to the patient, obtained patient history, performed a physical exam, and discussed plan for ED workup including potential diagnostic laboratory/imaging studies and interventions.  2:01 PM Reevaluated and updated the patient with findings. We discussed the plan for discharge and the patient is agreeable. Reviewed supportive cares, symptomatic treatment, outpatient follow up, and reasons to return to the Emergency Department. Patient to be discharged by ED RN.     33 year old  female who is currently pregnant who presents to the Emergency Department for evaluation of generalized abdominal pain for the past week.  Patient reports she tested positive for pregnancy on 10 October.  She has not had this pregnancy confirmed and has not seen a doctor for it.  On exam, she has mild tenderness to palpation over the umbilicus and suprapubic area.  No right lower or right upper quadrant tenderness.  No signs of peritonitis.  She has not taken anything today for pain.  She is overall well-appearing and in no acute distress.  Vital signs here are within normal limits and she is afebrile.  The symptoms have been ongoing for the past week.  Differential diagnosis includes but is not limited to ectopic pregnancy, pain related to pregnancy, miscarriage, gastritis, constipation, less likely bowel obstruction as she had a normal bowel movement  today, ovarian torsion, musculoskeletal pain, appendicitis, UTI, kidney stone, etc.  We discussed options for treatment of nausea in pregnancy.  We will start with 25 mg of oral Unisom and 25 mg of oral vitamin B6 as she has not been taking anything at home.  She was also given at 1000 mg of oral Tylenol for pain here.  Was given a liter of IV fluids.  Also given a dose of oral Maalox.    Overall the pain is somewhat generalized but the tenderness is in the umbilicus and suprapubic area.  She denies having any movement of the pain throughout the week as it is stayed generalized.  This is less suggestive of appendicitis especially as she has no right lower quadrant tenderness or pain.    Laboratory studies obtained.  She is O+.  She is not having any vaginal bleeding, discharge or loss of fluid.  Also is monogamous and denies any concerns for STDs thus felt that cervicitis would be unlikely.  CMP is largely unremarkable.  Normal electrolytes.  Normal LFTs.  CBC reveals a normal blood cell count of 6.7.  Hemoglobin at her baseline of 11.4.  Lipase within normal limits making pancreatitis unlikely.  Urinalysis without signs of UTI or hematuria.  Do feel like kidney stone is overall less likely with her presentation and lack of flank pain or urinary symptoms.  Again no sign of UTI or pyelonephritis.  Beta-hCG is 01709.    Pelvic ultrasound obtained and reveals a single living intrauterine gestation at 9 weeks 0 days.  Normal-appearing ovaries without sign of torsion or cyst.  Does have a small subchorionic hemorrhage.  At this point not entirely sure of the cause of her pain but could be pregnancy related versus potentially some constipation/gastritis as well.    Discussed risk and benefit of Zofran in pregnancy and will consider this if she is not improved after vitamin B6 and unisom and she was in agreement.     Patient felt improved after medications and on re-evaluation tenderness improved. Still no RLQ tenderness  on exam and WBC normal after a week of symptoms and no fever at home, thus felt that appendicitis would be less likely. With shared decision making, patient was comfortable not performing MRI of the abdomen at this time and will return if her symptoms worsen. She had told the nurse that she was continuing to have nausea and we discussed the risk and benefits of Zofran previously. She was given 4 mg of IV Zofran that was intended to be ordered on another patient. However, I was comfortable with the patient receiving this as we had previously discussed giving this for continued nausea and she had accepted this medication from the nurse. She was also given 0.5 mg of IV dilaudid intended to be ordered for another patient as she had told the nurse she was still having some pain. She had already been taking oxycodone at home that she had been prescribed after dental extraction last week and as a result I am not concerned about any significant adverse effects from a single dose of this. I discussed the situation with the patient and she was feeling improved and comfortable. Again no RLQ pain or tenderness and she would still like to be discharged without further imaging at this point. She was monitored here and doing well and comfortable with discharge. She remained vitally stable. We again discussed strict return precautions to the ER and the importance of OB GYN follow up to establish care. She was given prescriptions for maalox, prenatal vitamins, unisom, and vitamin B6. She was given referral to [facility] for follow up. We discussed not taking any further oxycodone or ibuprofen and that tylenol and safe in pregnancy. She voiced understanding and was comfortable with this plan. She was discharged in stable condition.          At the conclusion of the encounter I discussed the results of all of the tests and the disposition. The questions were answered. The patient or family acknowledged understanding and was  agreeable with the care plan.         Medical Decision Making    History:  Supplemental history from: Documented in chart, if applicable  External Record(s) reviewed: Outpatient Record: RN triage call 10/29/2023    Work Up:  Chart documentation includes differential considered and any EKGs or imaging independently interpreted by provider.  In additional to work up documented, I considered the following work up: Documented in chart, if applicable.    External consultation:  Discussion of management with another provider: Documented in chart, if applicable    Complicating factors:  Care impacted by chronic illness: N/A  Care affected by social determinants of health: Access to Medical Care    Disposition considerations: Discharge. I prescribed additional prescription strength medication(s) as charted. See documentation for any additional details.      MEDICATIONS GIVEN IN THE EMERGENCY:  Medications   sodium chloride 0.9% BOLUS 1,000 mL (0 mLs Intravenous Stopped 10/29/23 1419)   acetaminophen (TYLENOL) tablet 1,000 mg (1,000 mg Oral $Given 10/29/23 1159)   alum & mag hydroxide-simethicone (MAALOX) suspension 15 mL (15 mLs Oral $Given 10/29/23 1159)   doxylamine (UNISOM) tablet 25 mg (25 mg Oral $Given 10/29/23 1159)   pyridOXINE (VITAMIN B6) tablet 25 mg (25 mg Oral $Given 10/29/23 1159)   HYDROmorphone (PF) (DILAUDID) injection 0.5 mg (0.5 mg Intravenous $Given 10/29/23 1451)   ondansetron (ZOFRAN) injection 4 mg (4 mg Intravenous $Given 10/29/23 1447)       NEW PRESCRIPTIONS STARTED AT TODAY'S ER VISIT  New Prescriptions    ALUM & MAG HYDROXIDE-SIMETHICONE (MAALOX  ES) 400-400-40 MG/5ML SUSP SUSPENSION    Take 30 mLs by mouth every 4 hours as needed for indigestion    DOXYLAMINE (UNISOM) 25 MG TABS TABLET    Take 1 tablet (25 mg) by mouth at bedtime    PRENATAL VIT-FE FUMARATE-FA (PRENATAL MULTIVITAMIN W/IRON) 27-0.8 MG TABLET    Take 1 tablet by mouth daily    PYRIDOXINE (VITAMIN B6) 25 MG TABLET    Take 1 tablet  (25 mg) by mouth 4 times daily as needed (nausea, vomiting)          =================================================================    HPI    Patient information was obtained from: patient    Use of : Yes - (Phone) - Rigoberto Crooks is a 33 year old female with a pertinent history of pregancy(TARA 24, ), , who presents to this ED for evaluation of abdominal pain.    Per chart review, patient spoke with Andrew RN triage line today (10/29/2023) for persistent periumbilical abdominal pain which she rates a 6/10 pain since 10/27/2023. She was advised to go to the ED for worsening symptoms. Patient is currently pregnant (TARA 2024).    Patient reports persistent generalized diffuse abdominal pain for the past week which became more severe yesterday. She also associates persistent nausea for the past 2 weeks since finding out she was pregnant. Nothing she does exacerbates or worsens the pain. She has been taking tylenol and ibuprofen with no relief. She hasn't taken any medications for the nausea. Due to persistent symptoms, she called RN triage line who advised her to go to the ED for further evaluation.  She says that she's currently pregnant. She had a positive pregnancy test on 10/10/2023 and her LMP was 2023. She has not been seen yet for this pregnancy. Her previous pregnancy were uncomplicated.    She otherwise denies associating chest pain, shortness of breath, fever, cough, diarrhea, vomiting, change in bowel habits, constipation, blood in stool, black tarry stools, dysuria, urinary frequency, hematuria, back pain, flank pain, fever, vaginal discharge, vaginal bleeding, and pelvic pain. She denies history of GERD. She denies any other abdominal surgeries aside from her . Of note, she had a tooth extraction a week ago and was given tylenol, ibuprofen, and oxycodone. She was taking the oxycodone and last took this on Friday. She is monogamous and has no  "concerns for STDs. There were no other concerns/complaints at this time.      REVIEW OF SYSTEMS   Review of Systems   Pertinent positives and negatives are documented in the HPI. All other systems reviewed and are negative.      PAST MEDICAL HISTORY:  History reviewed. No pertinent past medical history.    PAST SURGICAL HISTORY:  History reviewed. No pertinent surgical history.        CURRENT MEDICATIONS:    alum & mag hydroxide-simethicone (MAALOX  ES) 400-400-40 MG/5ML SUSP suspension  doxylamine (UNISOM) 25 MG TABS tablet  Prenatal Vit-Fe Fumarate-FA (PRENATAL MULTIVITAMIN W/IRON) 27-0.8 MG tablet  pyridOXINE (VITAMIN B6) 25 MG tablet        ALLERGIES:  No Known Allergies    FAMILY HISTORY:  History reviewed. No pertinent family history.    SOCIAL HISTORY:   Social History     Socioeconomic History    Marital status:    Tobacco Use    Smoking status: Never     Passive exposure: Never    Smokeless tobacco: Never   Vaping Use    Vaping Use: Never used       VITALS:  /64   Pulse 67   Temp 97.6  F (36.4  C)   Resp 16   Ht 1.651 m (5' 5\")   Wt 77.6 kg (171 lb)   LMP 09/02/2023 (Approximate)   SpO2 100%   BMI 28.46 kg/m      PHYSICAL EXAM    Physical Exam  Constitutional: Well developed, Well nourished, NAD, GCS 15  HENT: Normocephalic, Atraumatic, Bilateral external ears normal, Oropharynx normal, mucous membranes moist, Nose normal. Neck-  Normal range of motion, No tenderness, Supple, No stridor.    Eyes: PERRL, EOMI, Conjunctiva normal, No discharge.   Respiratory: Normal breath sounds, No respiratory distress, No wheezing or crackles, Speaks in full sentences easily.    Cardiovascular: Normal heart rate, Regular rhythm,  No murmurs, No rubs, No gallops. 2+ radial pulses bilaterally  GI: Bowel sounds normal, Soft, Mild umbilical and suprapubic tenderness, No masses, No rebound or guarding. No CVA tenderness bilaterally,   Musculoskeletal: 2+ DP pulses. No notable lower extremity edema. Good " range of motion in all major joints. No tenderness to palpation or major deformities noted.   Integument: Warm, Dry, No erythema, No rash. No petechiae.   Neurologic: Alert & oriented x 3, 5/5 strength in all 4 extremities bilaterally. Sensation intact to light touch in all 4 extremities and the face bilaterally. No focal deficits noted. Normal gait.     Psychiatric: Affect normal, Judgment normal, Mood normal. Cooperative.      LAB:  All pertinent labs reviewed and interpreted.  Results for orders placed or performed during the hospital encounter of 10/29/23   US OB < 14 Weeks Single    Impression    IMPRESSION:   Single living intrauterine gestation at 9 weeks 0 days, EDC 6/2/2024.       Comprehensive metabolic panel   Result Value Ref Range    Sodium 135 135 - 145 mmol/L    Potassium 4.2 3.4 - 5.3 mmol/L    Carbon Dioxide (CO2) 21 (L) 22 - 29 mmol/L    Anion Gap 9 7 - 15 mmol/L    Urea Nitrogen 3.7 (L) 6.0 - 20.0 mg/dL    Creatinine 0.50 (L) 0.51 - 0.95 mg/dL    GFR Estimate >90 >60 mL/min/1.73m2    Calcium 9.5 8.6 - 10.0 mg/dL    Chloride 105 98 - 107 mmol/L    Glucose 86 70 - 99 mg/dL    Alkaline Phosphatase 63 35 - 104 U/L    AST 31 0 - 45 U/L    ALT 41 0 - 50 U/L    Protein Total 7.6 6.4 - 8.3 g/dL    Albumin 4.0 3.5 - 5.2 g/dL    Bilirubin Total 0.3 <=1.2 mg/dL   Result Value Ref Range    Lipase 33 13 - 60 U/L   UA with Microscopic reflex to Culture    Specimen: Urine, Clean Catch   Result Value Ref Range    Color Urine Light Yellow Colorless, Straw, Light Yellow, Yellow    Appearance Urine Clear Clear    Glucose Urine Negative Negative mg/dL    Bilirubin Urine Negative Negative    Ketones Urine Negative Negative mg/dL    Specific Gravity Urine 1.012 1.001 - 1.030    Blood Urine Negative Negative    pH Urine 6.0 5.0 - 7.0    Protein Albumin Urine Negative Negative mg/dL    Urobilinogen Urine <2.0 <2.0 mg/dL    Nitrite Urine Negative Negative    Leukocyte Esterase Urine 75 Juliane/uL (A) Negative    Mucus Urine  Present (A) None Seen /LPF    RBC Urine <1 <=2 /HPF    WBC Urine 1 <=5 /HPF    Squamous Epithelials Urine 3 (H) <=1 /HPF   HCG quantitative pregnancy   Result Value Ref Range    hCG Quantitative 96,566 (H) <5 mIU/mL   CBC with platelets and differential   Result Value Ref Range    WBC Count 6.7 4.0 - 11.0 10e3/uL    RBC Count 4.18 3.80 - 5.20 10e6/uL    Hemoglobin 11.4 (L) 11.7 - 15.7 g/dL    Hematocrit 35.1 35.0 - 47.0 %    MCV 84 78 - 100 fL    MCH 27.3 26.5 - 33.0 pg    MCHC 32.5 31.5 - 36.5 g/dL    RDW 14.5 10.0 - 15.0 %    Platelet Count 420 150 - 450 10e3/uL    % Neutrophils 58 %    % Lymphocytes 30 %    % Monocytes 9 %    % Eosinophils 2 %    % Basophils 1 %    % Immature Granulocytes 0 %    NRBCs per 100 WBC 0 <1 /100    Absolute Neutrophils 4.0 1.6 - 8.3 10e3/uL    Absolute Lymphocytes 2.0 0.8 - 5.3 10e3/uL    Absolute Monocytes 0.6 0.0 - 1.3 10e3/uL    Absolute Eosinophils 0.1 0.0 - 0.7 10e3/uL    Absolute Basophils 0.0 0.0 - 0.2 10e3/uL    Absolute Immature Granulocytes 0.0 <=0.4 10e3/uL    Absolute NRBCs 0.0 10e3/uL   Adult Type and Screen   Result Value Ref Range    ABO/RH(D) O POS     Antibody Screen Negative Negative    SPECIMEN EXPIRATION DATE [id number]        RADIOLOGY:  Reviewed all pertinent imaging. Please see official radiology report.  US OB < 14 Weeks Single   Final Result   IMPRESSION:    Single living intrauterine gestation at 9 weeks 0 days, EDC 6/2/2024.                EKG:    None    PROCEDURES:   None      I, Vianney Ibrahim, am serving as a scribe to document services personally performed by Alix Pimentel MD based on my observation and the provider's statements to me. I, Alix Pimentel MD, attest that Vianney Ibrahim is acting in a scribe capacity, has observed my performance of the services and has documented them in accordance with my direction.    Alix Pimentel MD  [facility] EMERGENCY ROOM  [address] [id number] [phone]      Alix Pimentel MD  10/29/23 1836

## 2023-10-29 NOTE — ED NOTES
Patient had no questions at the end of discharge instruction review and stated that she had a ride home and denied wheelchair to transportation home.

## 2023-12-10 PROBLEM — Z97.5 IUD (INTRAUTERINE DEVICE) IN PLACE: Status: RESOLVED | Noted: 2020-01-20 | Resolved: 2023-12-10

## 2023-12-11 NOTE — PROGRESS NOTES
Clinic Note - Initial OB Visit    Yadira Duffy is a 33 year old  female who presents to clinic for a new OB visit.      Her last menstrual period was 23.  Estimated Date of Delivery: 2024 via LMP c/w 1st tri US - 14+3 wks today    She has not had bleeding since her LMP. She has had any abdominal pain or cramping since her LMP - 5 days per week, mostly during night and ok during, feels like cramping in stomach - not neceesarily noticing reflux. She had nausea but better now. Weigh loss has not occurred.     OTHER CONCERNS: no    Pre Term Labor Risk Assessment  Is the patient's age <18 or >40? No  Patint's BMI is Body mass index is 28.86 kg/m .. Does patient have a BMI < 18.5? No  If previous pregnancy, was delivery within previous 6 months? No  Have you ever delivered a baby prior to 37 weeks gestation? No  Did conception for this pregnancy occur via In Vitro Fertilization? No  Summary: Patient is not high risk for  Labor for  labor.    Patient Active Problem List   Diagnosis    Maternal care due to low transverse uterine scar from previous  delivery     (vaginal birth after )    Cervical high risk HPV (human papillomavirus) test positive     OB History    Para Term  AB Living   3 2 2 0 0 1   SAB IAB Ectopic Multiple Live Births   0 0 0 0 1      # Outcome Date GA Lbr Tien/2nd Weight Sex Delivery Anes PTL Lv   3 Current            2 Term 19 40w4d  3.544 kg (7 lb 13 oz) F Vag-Spont         Name: BG YADIRA DUFFY      Apgar1: 7  Apgar5: 9   1 Term 2018    F CS-Unspec   SABRINA      Complications: Failure to Progress in First Stage   1st pregnancy: in [place], thinks was failure to progress, around 7 lbs - 5 days before (39+2 weeks), started with contractions  2nd pregnancy:  successful, healthy pregnancy      History reviewed. No pertinent past medical history.    Past Surgical History:   Procedure Laterality Date     SECTION       2018     Current Outpatient Medications   Medication Sig Dispense Refill    Prenatal Vit-Fe Fumarate-FA (PRENATAL MULTIVITAMIN W/IRON) 27-0.8 MG tablet Take 1 tablet by mouth daily 90 tablet 3     Do you have a history of any of the following medical conditions?  Condition Yes/No   Hypertension No   Heart disease, mitral valve prolapse, rheumatic fever No   Asthma or another chronic lung disease No   An autoimmune disorder No   Kidney disease No   Frequent urinary tract infections No   Migraine headaches No   Stroke, loss of sensation/function, seizures, or other neuro problem No   Diabetes No   Thyroid problems or have you taken thyroid medication No   Hepatitis, liver disease, jaundice No   Blood clots, phlebitis, pulmonary embolism or varicose veins No   Excessive bleeding after surgery or dental work No   Heavy menstrual periods requiring treatment No   Anemia No   Blood transfusions No        Would you refuse a blood transfusion? No     Prenatal Genetic Screening  Do you have a history of any of the following Yes/No        A metabolic disorder (e.g. Insulin-dependent DM, PKU) No        Recurrent pregnancy loss No     Do you, the baby's father, or anyone in your families have Yes/No        Thalassemia AND MCV <80 No        Hemophilia No        Neural tube defect No        Congenital heart defect No        Sickle cell disease or trait No        Muscular dystrophy No        Cystic fibrosis No        Mental retardation or autism No        Down's syndrome No        Alonzo-Sach's disease No        [place]'s chorea No        Any other inherited genetic or chromosomal disorder No        A child with birth defects not listed above No   FOB none    Infection History  At high risk for coming in contact with HIV No   Ever treated for tuberculosis No   Ever received the BCG vaccine for tuberculosis No   Ever had genital herpes (or has your partner) No   Had a rash or viral illness since LMP No   Ever had a sexually  "transmitted infection No   Ever had chicken pox or the vaccine Yes   Ever had any other serious infectious disease No   Up to date with immunizations Yes      PERSONAL/SOCIAL HISTORY  Social History     Socioeconomic History    Marital status:    Tobacco Use    Smoking status: Never     Passive exposure: Never    Smokeless tobacco: Never   Vaping Use    Vaping Use: Never used     Have you used any tobacco products, alcohol or any other drugs during this pregnancy before or after your knew you were pregnant? no    REVIEW OF SYSTEMS  C: NEGATIVE for fever, chills, change in weight  E: NEGATIVE for vision changes or irritation  ENT: NEGATIVE for ear, mouth and throat problems  R: NEGATIVE for significant cough or SOB  B: NEGATIVE for masses, tenderness or discharge  CV: NEGATIVE for chest pain, palpitations or peripheral edema  GI: NEGATIVE for nausea, heartburn, or change in bowel habits  : NEGATIVE for unusual urinary or vaginal symptoms.   M: NEGATIVE for significant arthralgias or myalgia  N: NEGATIVE for weakness, dizziness or paresthesias  P: NEGATIVE for changes in mood or affect    PHYSICAL EXAM:  /60   Pulse 106   Temp 97.4  F (36.3  C) (Temporal)   Resp 16   Ht 1.651 m (5' 5\")   Wt 78.7 kg (173 lb 6.4 oz)   LMP 2023 (Approximate)   SpO2 99%   BMI 28.86 kg/m     GENERAL:  Pleasant pregnant female, alert, well groomed.  SKIN:  Warm and dry, without lesions or rashes  EYES:  sclera normal  MOUTH:  Buccal mucosa pink  LUNGS:  Clear to auscultation. Breathing comfortably on room air  HEART:  RRR without murmur.  ABDOMEN: Soft without masses, tenderness or organomegaly.  Fundus palpable at symphysis pubis.   MUSCULOSKELETAL:  Full range of motion.  EXTREMITIES:  No edema. No significant varicosities.     ASSESSMENT/PLAN  Supervision of other normal pregnancy, antepartum   at 14+3 weeks today. Pregnancy complicated by hx . Labs as below, including invitae genetic " screening. Pt reports planning delivery at [place]. Will schedule future apts today, discussed plan for next US at 20 weeks. Suspect gastritis/reflux as cause of symptoms - discussed OTC meds to try and help with this.   - Hepatitis B surface antigen  - HIV Antigen Antibody Combo  - Rubella Antibody IgG  - Treponema Abs w Reflex to RPR and Titer  - Urine Culture Aerobic Bacterial  - Chlamydia trachomatis PCR  - Hepatitis B Surface Antibody  - Neisseria gonorrhoeae PCR  - UA with Micro  - Vitamin D Deficiency  - Hepatitis C antibody  - CBC with platelets  - Invitae Non-Invasive Prenatal Screening    Hx of  section   (vaginal birth after )  Hx  delivery with first pregnancy in [place], there is a note that states failure to progress in first stage. Pt had successful  with last delivery and is planning that again this time.    Encounter for vaccination  Flu shot, administered today.  - INFLUENZA VACCINE >6 MONTHS (AFLURIA/FLUZONE)       - Routine prenatal labs today  - Pap smear due next year  - Early ultrasound for dating completed.     Referral(s): none    Discussed:  -Recommended weight gain of 25-35 lbs. for BMI of Body mass index is 28.86 kg/m ..  -Genetic screening options, including false positive rate of 5% with screening tests and diagnostic options (chorionic villus sampling, amniocentesis), and patient desires - invitae ordered.  -Safe medications during pregnancy. Is currently taking prenatal vitamin daily.   -Healthy habits including not using tobacco or alcohol, exercising regularly and maintaining healthy diet  -Information given on tips for dealing with nausea, healthy habits, exposures, safety, prenatal appointment schedule, and when to call the doctor.  -Recommendations for breastfeeding.    Will follow up in 4 weeks for routine pre-karely care.    Brooke Young MD  [facility]

## 2023-12-12 ENCOUNTER — PRENATAL OFFICE VISIT (OUTPATIENT)
Dept: FAMILY MEDICINE | Facility: CLINIC | Age: 33
End: 2023-12-12
Payer: COMMERCIAL

## 2023-12-12 VITALS
BODY MASS INDEX: 28.89 KG/M2 | TEMPERATURE: 97.4 F | DIASTOLIC BLOOD PRESSURE: 60 MMHG | RESPIRATION RATE: 16 BRPM | HEART RATE: 106 BPM | OXYGEN SATURATION: 99 % | SYSTOLIC BLOOD PRESSURE: 100 MMHG | WEIGHT: 173.4 LBS | HEIGHT: 65 IN

## 2023-12-12 DIAGNOSIS — O34.219 VBAC (VAGINAL BIRTH AFTER CESAREAN): ICD-10-CM

## 2023-12-12 DIAGNOSIS — Z34.80 SUPERVISION OF OTHER NORMAL PREGNANCY, ANTEPARTUM: Primary | ICD-10-CM

## 2023-12-12 DIAGNOSIS — Z23 ENCOUNTER FOR VACCINATION: ICD-10-CM

## 2023-12-12 DIAGNOSIS — Z98.891 HX OF CESAREAN SECTION: ICD-10-CM

## 2023-12-12 LAB
ALBUMIN UR-MCNC: NEGATIVE MG/DL
APPEARANCE UR: CLEAR
BILIRUB UR QL STRIP: NEGATIVE
COLOR UR AUTO: YELLOW
ERYTHROCYTE [DISTWIDTH] IN BLOOD BY AUTOMATED COUNT: 14 % (ref 10–15)
GLUCOSE UR STRIP-MCNC: NEGATIVE MG/DL
HCT VFR BLD AUTO: 33.1 % (ref 35–47)
HGB BLD-MCNC: 11.4 G/DL (ref 11.7–15.7)
HGB UR QL STRIP: NEGATIVE
KETONES UR STRIP-MCNC: NEGATIVE MG/DL
LEUKOCYTE ESTERASE UR QL STRIP: ABNORMAL
MCH RBC QN AUTO: 28.7 PG (ref 26.5–33)
MCHC RBC AUTO-ENTMCNC: 34.4 G/DL (ref 31.5–36.5)
MCV RBC AUTO: 83 FL (ref 78–100)
NITRATE UR QL: NEGATIVE
PH UR STRIP: 7 [PH] (ref 5–8)
PLATELET # BLD AUTO: 337 10E3/UL (ref 150–450)
RBC # BLD AUTO: 3.97 10E6/UL (ref 3.8–5.2)
SP GR UR STRIP: 1.02 (ref 1–1.03)
UROBILINOGEN UR STRIP-ACNC: 0.2 E.U./DL
WBC # BLD AUTO: 7.8 10E3/UL (ref 4–11)

## 2023-12-12 PROCEDURE — 86780 TREPONEMA PALLIDUM: CPT | Performed by: FAMILY MEDICINE

## 2023-12-12 PROCEDURE — 82306 VITAMIN D 25 HYDROXY: CPT | Performed by: FAMILY MEDICINE

## 2023-12-12 PROCEDURE — 81003 URINALYSIS AUTO W/O SCOPE: CPT | Performed by: FAMILY MEDICINE

## 2023-12-12 PROCEDURE — 36415 COLL VENOUS BLD VENIPUNCTURE: CPT | Performed by: FAMILY MEDICINE

## 2023-12-12 PROCEDURE — 86803 HEPATITIS C AB TEST: CPT | Performed by: FAMILY MEDICINE

## 2023-12-12 PROCEDURE — 87591 N.GONORRHOEAE DNA AMP PROB: CPT | Performed by: FAMILY MEDICINE

## 2023-12-12 PROCEDURE — 85027 COMPLETE CBC AUTOMATED: CPT | Performed by: FAMILY MEDICINE

## 2023-12-12 PROCEDURE — 86762 RUBELLA ANTIBODY: CPT | Performed by: FAMILY MEDICINE

## 2023-12-12 PROCEDURE — 87340 HEPATITIS B SURFACE AG IA: CPT | Performed by: FAMILY MEDICINE

## 2023-12-12 PROCEDURE — 90471 IMMUNIZATION ADMIN: CPT | Performed by: FAMILY MEDICINE

## 2023-12-12 PROCEDURE — 87086 URINE CULTURE/COLONY COUNT: CPT | Performed by: FAMILY MEDICINE

## 2023-12-12 PROCEDURE — 90686 IIV4 VACC NO PRSV 0.5 ML IM: CPT | Performed by: FAMILY MEDICINE

## 2023-12-12 PROCEDURE — 87491 CHLMYD TRACH DNA AMP PROBE: CPT | Performed by: FAMILY MEDICINE

## 2023-12-12 PROCEDURE — 99214 OFFICE O/P EST MOD 30 MIN: CPT | Mod: 25 | Performed by: FAMILY MEDICINE

## 2023-12-12 PROCEDURE — 87389 HIV-1 AG W/HIV-1&-2 AB AG IA: CPT | Performed by: FAMILY MEDICINE

## 2023-12-12 PROCEDURE — 86706 HEP B SURFACE ANTIBODY: CPT | Performed by: FAMILY MEDICINE

## 2023-12-12 ASSESSMENT — PAIN SCALES - GENERAL: PAINLEVEL: MILD PAIN (2)

## 2023-12-12 NOTE — PATIENT INSTRUCTIONS
For the stomach:  -try TUMS (over the counter)  -other options would be mylanta or maalox  -next step if needed would be trying famotidine 20mg with dinner daily  -final step would be omeprazole 20mg with dinner daily          Nonprescription Medications Thought To Be Safe In Pregnancy (take medication according to package directions)    NAUSEA AND MOTION SICKNESS   Vitamin C 500 mg, once a day with food   Vitamin B6 50 mg, one three times a day   Unisom tablets (not gel tabs) - 1/2 to 1 tab at bedtime; may also take 1/2 tab in the morning and mid-afternoon   Dramamine   Sea Bands   Kim tablets    CONGESTION AND COLDS   Chlortrimeton   Benadryl   Vicks Vapor Rub   Cough Drops  Avoid Robitussin and Mucinex in 1st trimester but otherwise safe during rest of pregnancy  Avoid pseudoephedrine     ALLERGIES   Alavert   Claritin   Tavist   Benadryl   Zyrtec    HEADACHES   Tylenol 325 mg 2-3 four times a day   Tylenol 500 mg 1-2 four times a day   DO NOT EXCEED 4,000 MG A DAY  DO NOT TAKE IBUPROFEN, MOTRIN, ADVIL, ASPIRIN, OR ALEVE     VAGINAL YEAST INFECTION   Monistat 3 or 7   Gyne-Lotrimin    HEMORRHOIDS   Preparation-H   Anusol   Anusol HC    HEARTBURN   Tums   Maalox (tablets or liquid)   Rolaids   Mylanta   Gaviscon   Pepcid AC  NO PEPTOBISMOL OR ALKASELTZER (contains aspirin)     DIARRHEA (do not treat for the first 24-48 hrs)   Kaopectate   Imodium AD    CONSTIPATION   Colace (Docusate Sodium) - stool softener   Davina-Colace (Colace + mild stimulant)   Any fiber supplement (Metamucil, Fibercon ,etc.)    GAS   Gas-X   Mylanta II with Simethicone   Mylicon    INSECT BITES   Lotions: Calamine, Caladryl, Benadryl   Oral: Benadryl tabs 25-50mg (every 6-8hrs)     Tips to Relieve Nausea  Although nausea can occur at any time of the day, it may be worse in the morning. To help prevent nausea:  Eat small, light meals at frequent intervals.  Get up slowly. Eat a few unsalted crackers before you get out of bed.  Drink water  or 7-Up to soothe your stomach.  Eat an ice pop in your favorite flavor.  Ask your health care provider about taking tim or vitamin B6 for nausea and vomiting.  Talk with your health care provider if you take vitamins that upset your stomach.  Safe Medications  Take one prenatal vitamin with at least 400 mcg of folic acid daily.  Use acetaminophen (Tylenol) for pain.   Try Maalox or Tums for heartburn. If these are not working, talk to your doctor about trying a different medication.  Diphenhydramine (Benadryl) can also be used safely in pregnancy.  Talk to your doctor about any other medications or vitamins you are taking!  Start Healthy Habits Now  What matters most is protecting your baby from this moment on. If you smoke, drink alcohol, or use drugs, now is the time to stop. If you need help, talk with your health care provider.  Smoking increases the risk of miscarriage or having a low-birth-weight baby. If you smoke, quit now.  Alcohol and drugs have been linked with miscarriage, birth defects, intellectual disability, and low birth weight. Do not drink alcohol or take drugs.  Continue your current exercise routine, or start one with walking, swimming, and other low impact exercises. Yoga specifically designed for pregnant moms is a great stress and pain reliever. Keep your self well hydrated and avoid overheating with all activity. If bleeding, fluid leakage, or cramping/contractions occur, stop the exercise and call your doctor.   Wear your seatbelt every time you are in the car. Fasten the lap part underneath your growing belly and the shoulder part as you normally would.   Weight Gain  Add an additional 300 to 400 calories a day into your diet.  Ideal weight gain is 25 to 35 pounds.  If your BMI is over 30, your ideal weight gain is 15 pounds.  If your BMI is under 20, your ideal weight gain is 40 pounds.  Talk to your doctor if you are concerned about weight gain.   Keep Your Environment Save  You can  still clean house and use scented products. Just take some simple precautions:  Wear gloves when using cleaning fluids.  Open windows to let in fresh air. Use a fan if you paint.  Avoid secondhand smoke.  Don t breathe fumes from nail polish, hair spray, cleansers, or other chemicals.  Work Concerns  The end of the first trimester is a good time to discuss working during pregnancy with your employer. Follow your health care provider s advice if your job requires you to stand for a long time, work with hazardous tools, or even sit at a desk all day. Your workspace, workload, or scheduled hours may need to be adjusted - perhaps you can change body postures more often or take an extra break.  Intimacy  Unless your health care provider tells you to, there is no reason to stop having sex while you re pregnant. You or your partner may notice changes in desire. Desire may be less in the first trimester, due to nausea and fatigue. In the second trimester, sex may be very enjoyable. The third trimester can be a challenge comfort-wise. Try different positions and see what s best for you both. As always, let your doctor know if you experience any bleeding, leakage of fluids, or cramping/contractions.  Other Pregnancy Concerns  Limit the amount of radiation you are exposed to. Always tell the radiology technologist that you are pregnant if having an xray or CT scan done.   Practice good hand washing to prevent infection.  Avoid cat litter.   Wash all fruits and vegetabes. Always cook meat thoroughly and do not eat raw fish. Do not eat more than 6 to 12 ounces of other fish sources.   Do not eat soft cheeses.  Limit caffeine to less than 200 milligrams a days. The average cup of coffee as approximately 120 milligrams of caffeine.

## 2023-12-13 LAB
C TRACH DNA SPEC QL NAA+PROBE: NEGATIVE
HBV SURFACE AB SERPL IA-ACNC: >1000 M[IU]/ML
HBV SURFACE AB SERPL IA-ACNC: REACTIVE M[IU]/ML
HBV SURFACE AG SERPL QL IA: NONREACTIVE
HCV AB SERPL QL IA: NONREACTIVE
HIV 1+2 AB+HIV1 P24 AG SERPL QL IA: NONREACTIVE
N GONORRHOEA DNA SPEC QL NAA+PROBE: NEGATIVE
RUBV IGG SERPL QL IA: 5.18 INDEX
RUBV IGG SERPL QL IA: POSITIVE
T PALLIDUM AB SER QL: NONREACTIVE
VIT D+METAB SERPL-MCNC: 33 NG/ML (ref 20–50)

## 2023-12-14 LAB — BACTERIA UR CULT: NO GROWTH

## 2023-12-19 LAB — SCANNED LAB RESULT: NORMAL

## 2024-01-13 NOTE — COMMUNITY RESOURCES LIST (ENGLISH)
01/13/2024   [facility]  N/A  For questions about this resource list or additional care needs, please contact your primary care clinic or care manager.  Phone: [phone]   Email: N/A   Address: [facility] [place] [address]   Hours: N/A        Hotlines and Helplines       Hotline - Housing crisis  1  [facility] (Main Office) Distance: 15.03 miles      Phone/Virtual   [address] [place] [address]  Language: English  Hours: Mon - Sun Open 24 Hours   Phone: (708) 833-8456 Email: info@[facility] Website: http://[facility].[employer]     2  [facility] Distance: 17.3 miles      Phone/Virtual   [address] [place] [address]  Language: English  Hours: Mon - Sun Open 24 Hours   Phone: (266) 986-8573 Email: communications@[facility].org Website: https://[facility] [place].org/oursaviourshousing/          Housing       Coordinated Entry access point  3  [facility] Distance: 10.51 miles      In-Person, Phone/Virtual   [address] [place] [address]  Language: English  Hours: Mon - Fri 8:30 AM - 4:30 PM  Fees: Free   Phone: (475) 324-6869 Email: info@[facility].org Website: https://www.[facility].org/locations/[facility]/     4  [facility] ([facility] - Housing Services Distance: 17.4 miles      In-Person   [address] [place] [address]  Language: English  Hours: Mon - Fri 9:00 AM - 5:00 PM  Fees: Free   Phone: (258) 480-8575 Email: housing@[facility].org Website: http://www.[facility].org/housing     Drop-in center or day shelter  5  [facility] Distance: 10.33 miles      In-Person   [address] [place] [address]  Language: English  Hours: Mon - Fri 9:00 AM - 4:00 PM  Fees: Free   Phone: (367) 168-6744 Email: luis@[employer].org Website: http://listeninghouse.org     6  [facility] Distance: 17.55 miles       In-Person   [address] [place] [address]  Language: English, Dutch, Belgian  Hours: Mon - Sat 7:00 AM - 3:00 PM  Fees: Free, Self Pay   Phone: (804) 436-6468 Email: info@[employer] Website: https://www.[employer].org/locations/opportunity-center/     Housing search assistance  7  [facility] - Homeless Resources and Housing Information - Housing search assistance Distance: 2.84 miles      In-Person, Phone/Virtual   [address] [place] [address]  Language: English  Hours: Mon - Fri 8:00 AM - 4:30 PM  Fees: Free   Phone: (766) 565-4803 Email: angela@[facility]. Website: https://www.coRpptrip.[facility]./Facilities/Facility/Details/[facility]-22     8  [facility] - Housing Resources Distance: 6.04 miles      In-Person, Phone/Virtual   [address] [place] [address]  Language: English  Hours: Mon - Fri 8:00 AM - 4:30 PM  Fees: Free   Phone: (789) 972-9221 Email: office@[employer] Website: http://[employer].org     Shelter for families  9   [facility] Distance: 22.32 miles      In-Person   [address] [place] [address]  Language: English  Hours: Mon - Fri 3:00 PM - 9:00 AM , Sat - Sun Open 24 Hours  Fees: Free   Phone: (739) 719-5546 Ext.1 Website: https://www.[facility].org/2020/07/03/emergency-family-shelter/     Shelter for individuals  10  [facility] - Homeless Resources and Housing Information - Emergency housing Distance: 2.84 miles      In-Person, Phone/Virtual   [address] [place] [address]  Language: English  Hours: Mon - Fri 8:00 AM - 4:30 PM  Fees: Free   Phone: (119) 932-3819 Email: angela@[facility]. Website: https://www.[place]./Facilities/Facility/Details/[facility]-22     11   [facility] [place] [facility] [place] [facility] Distance: 10.55 miles      In-Person   [address] [place] [address]  Language: English  Hours: Mon - Sun 5:00 PM - 10:00 AM  Fees: Free, Self Pay   Phone: (621) 504-7143 Email: info@[employer] Website: https://www.[employer]/locations/[facility]/          Important Numbers & Websites       Emergency Services   911  [facility] Services   311  Poison Control   (348) 293-5481  Suicide Prevention Lifeline   (565) 669-5709 (TALK)  Child Abuse Hotline   (498) 459-3012 (4-A-Child)  Sexual Assault Hotline   (955) 433-1620 (HOPE)  National Runaway Safeline   (365) 914-9674 (RUNAWAY)  All-Options Talkline   (623) 650-8119  Substance Abuse Referral   (628) 427-2563 (HELP)

## 2024-01-15 NOTE — PROGRESS NOTES
"Clinic Note - Return OB Visit    Yadira Duffy is a 33 year old  female who presents to clinic for a follow up OB visit. She is currently 19w2d with an estimated date of delivery of 2024 via LMP c/w 1st tri US. She denies headache, chest pain, shortness of breath, abdominal pain/contractions, vaginal bleeding, or abnormal discharge. She has not felt baby move.     New concerns today:   -cramping overnight, mild but a lot - doesn't feel like contractions  -sometimes back pain    OB History    Para Term  AB Living   3 2 2 0 0 1   SAB IAB Ectopic Multiple Live Births   0 0 0 0 1      # Outcome Date GA Lbr Tien/2nd Weight Sex Delivery Anes PTL Lv   3 Current            2 Term 19 40w4d  3.544 kg (7 lb 13 oz) F Vag-Spont         Name: BG YADIRA DUFFY      Apgar1: 7  Apgar5: 9   1 Term 2018    F CS-Unspec   SABRINA      Complications: Failure to Progress in First Stage   1st pregnancy: in [place], thinks was failure to progress, around 7 lbs - 5 days before (39+2 weeks), started with contractions  2nd pregnancy:  successful, healthy pregnancy    Physical exam:  /70   Pulse 93   Temp 97.5  F (36.4  C) (Temporal)   Resp 18   Ht 1.651 m (5' 5\")   Wt 80.7 kg (178 lb)   LMP 2023 (Approximate)   SpO2 99%   BMI 29.62 kg/m      General: alert, female in no acute distress  Abdomen: gravid uterus appropriate for gestation age above pubic symphysis, non-tender, FHTs 150  Extremities: no edema    Assessment/Plan:  Supervision of other normal pregnancy, antepartum   at 19+3 weeks today. Pregnancy complicated by hx . Pt reports planning delivery at [facility]. Fetal survey ordered today. Overall doing well - will continue to monitor cramping, does not sound like  contractions.  - US OB > 14 Weeks    Hx of  section  Hx  delivery with first pregnancy in [place], there is a note that states failure to progress in first stage. Pt had " successful  with last delivery and is planning that again this time.       Follow up in 4 weeks for routine prenatal visit     Brooke Young MD  [facility]

## 2024-01-16 ENCOUNTER — PRENATAL OFFICE VISIT (OUTPATIENT)
Dept: FAMILY MEDICINE | Facility: CLINIC | Age: 34
End: 2024-01-16
Payer: COMMERCIAL

## 2024-01-16 VITALS
DIASTOLIC BLOOD PRESSURE: 70 MMHG | WEIGHT: 178 LBS | SYSTOLIC BLOOD PRESSURE: 100 MMHG | BODY MASS INDEX: 29.66 KG/M2 | HEIGHT: 65 IN | TEMPERATURE: 97.5 F | RESPIRATION RATE: 18 BRPM | OXYGEN SATURATION: 99 % | HEART RATE: 93 BPM

## 2024-01-16 DIAGNOSIS — Z34.80 SUPERVISION OF OTHER NORMAL PREGNANCY, ANTEPARTUM: Primary | ICD-10-CM

## 2024-01-16 DIAGNOSIS — Z98.891 HX OF CESAREAN SECTION: ICD-10-CM

## 2024-01-16 PROCEDURE — 99207 PR PRENATAL VISIT: CPT | Performed by: FAMILY MEDICINE

## 2024-01-16 ASSESSMENT — PAIN SCALES - GENERAL: PAINLEVEL: NO PAIN (0)

## 2024-01-22 ENCOUNTER — HOSPITAL ENCOUNTER (OUTPATIENT)
Dept: ULTRASOUND IMAGING | Facility: CLINIC | Age: 34
Discharge: HOME | End: 2024-01-22
Attending: FAMILY MEDICINE | Admitting: FAMILY MEDICINE
Payer: COMMERCIAL

## 2024-01-22 DIAGNOSIS — Z34.80 SUPERVISION OF OTHER NORMAL PREGNANCY, ANTEPARTUM: ICD-10-CM

## 2024-01-22 PROCEDURE — 76805 OB US >/= 14 WKS SNGL FETUS: CPT

## 2024-02-05 ENCOUNTER — HOSPITAL ENCOUNTER (OUTPATIENT)
Dept: ULTRASOUND IMAGING | Facility: CLINIC | Age: 34
Discharge: HOME | End: 2024-02-05
Attending: FAMILY MEDICINE | Admitting: FAMILY MEDICINE
Payer: COMMERCIAL

## 2024-02-05 DIAGNOSIS — Z34.80 SUPERVISION OF OTHER NORMAL PREGNANCY, ANTEPARTUM: ICD-10-CM

## 2024-02-05 PROCEDURE — 76816 OB US FOLLOW-UP PER FETUS: CPT

## 2024-02-19 NOTE — PROGRESS NOTES
"Clinic Note - Return OB Visit    Yadira Duffy is a 33 year old  female who presents to clinic for a follow up OB visit. She is currently 25w3d with an estimated date of delivery of 2024 via 1st tri . She denies headache, chest pain, shortness of breath, abdominal pain/contractions, vaginal bleeding, or abnormal discharge. She has felt baby move.     New concerns today:   -back pain - whole back - no sciatica - better with rest - can't work as much but doesn't need note she says  -note: due date changed today    OB History    Para Term  AB Living   3 2 2 0 0 1   SAB IAB Ectopic Multiple Live Births   0 0 0 0 1      # Outcome Date GA Lbr Tien/2nd Weight Sex Delivery Anes PTL Lv   3 Current            2 Term 19 40w4d  3.544 kg (7 lb 13 oz) F Vag-Spont         Name: BG YADIRA DUFFY      Apgar1: 7  Apgar5: 9   1 Term     F CS-Unspec   SABRINA      Complications: Failure to Progress in First Stage   1st pregnancy: in [place], thinks was failure to progress, around 7 lbs - 5 days before (39+2 weeks), started with contractions  2nd pregnancy:  successful, healthy pregnancy    Physical exam:  /70   Pulse 99   Temp 98.4  F (36.9  C) (Temporal)   Resp 16   Ht 1.651 m (5' 5\")   Wt 82.3 kg (181 lb 6.4 oz)   LMP 2023 (Approximate)   SpO2 99%   BMI 30.19 kg/m      General: alert, female in no acute distress  Abdomen: gravid uterus at 27 cm, non-tender, FHTs 140  Extremities: no edema    Assessment/Plan:  Supervision of other normal pregnancy, antepartum   at 25+3 weeks today. Pregnancy complicated by hx . Pt reports planning delivery at [facility]. Letter given today to try to get mother to US prior to delivery.     Hx of  section  Hx  delivery with first pregnancy in [place], there is a note that states failure to progress in first stage. Pt had successful  with last delivery and is planning that again this time. "       Follow up in 3 weeks for routine prenatal visit     Brooke Young MD  [facility]

## 2024-02-19 NOTE — PATIENT INSTRUCTIONS
Back pain:  -start tylenol 1000mg up to three times a day as needed  -try ice and/or heat to the back  -ok to do gentle stretches/yoga poses (cat and cow poses)  -referral to physical therapy if needed      Phone Numbers:  [facility]: [phone]  [facility]: [phone]     When to call or come in to the hospital  If you notice a decrease in your baby's movement.   If your begin to experience contractions that are 5 minutes or less apart and lasting for over 45 seconds, or if contractions are becoming more painful.  If you have any bleeding or leakage of fluids.   If you have a headache not resolved with tylenol, right upper abdominal pain, or sudden onset of swelling.  You know your body best. Never hesitate to call or go to the hospital if something doesn't feel right!    Gestational Diabetes Screen  At your next visit, you will be screened for gestational diabetes. You will drink a sugary drink and then have your blood drawn to see how your body responds to a sugar load. This test takes about an hour to complete - please plan your schedule accordingly!    The Benefits of Breastfeeding   Breastfeeding gives your new baby the very best start. It supplies nutrition, comfort, and love. Experts agree: Breastfeeding is the healthiest choice for babies during the first year of life and beyond. It s healthy for Mom, too. Breastfeeding may be challenging at first. But with support, you and your baby can succeed together.      Healthiest for Baby   Breastmilk is the ideal food for babies. It has all the nutrients your little one needs to grow healthy and strong. Here are some of the many benefits for your baby:   Breastfeeding provides contact that babies love. Frequent skin-to-skin time with Mom is calming and comforting.   Breastmilk is full of antibodies. These are substances that help your baby fight infection. Breastmilk reduces your baby's risk of respiratory illnesses, ear infections, and diarrhea.    Breastfeeding reduces your baby s risk of allergies, colds, and many other diseases.   Breastmilk changes as your baby grows, meeting her changing needs.   Breastmilk is easy for your baby to digest.   Breastmilk contains DHA, a fat that is good for your baby s developing brain and eyes.   Breastmilk decreases the risk of sudden infant death syndrome (SIDS).    Healthiest for Mom   For many women, breastfeeding is an amazing experience. It creates a strong bond between mother and baby. Other benefits for Mom include:   You can feel proud knowing that your baby is growing healthy and strong because of your milk.   Breastmilk is convenient. It s free, clean, and always the right temperature.   Breastfeeding burns calories. This can help you lose pregnancy weight faster.   Breastfeeding releases hormones that contract the uterus. This helps the uterus return to its normal size after childbirth.   Mothers who breastfeed have a decreased risk of ovarian and breast cancers.    [place] parenting classes [email or web address]  [place] parenting classes [email or web address]-information  Free online classes through [employer]

## 2024-02-21 ENCOUNTER — PRENATAL OFFICE VISIT (OUTPATIENT)
Dept: FAMILY MEDICINE | Facility: CLINIC | Age: 34
End: 2024-02-21
Payer: COMMERCIAL

## 2024-02-21 VITALS
RESPIRATION RATE: 16 BRPM | BODY MASS INDEX: 30.22 KG/M2 | WEIGHT: 181.4 LBS | OXYGEN SATURATION: 99 % | HEART RATE: 99 BPM | SYSTOLIC BLOOD PRESSURE: 130 MMHG | DIASTOLIC BLOOD PRESSURE: 70 MMHG | HEIGHT: 65 IN | TEMPERATURE: 98.4 F

## 2024-02-21 DIAGNOSIS — Z98.891 HX OF CESAREAN SECTION: ICD-10-CM

## 2024-02-21 DIAGNOSIS — M54.9 BACK PAIN AFFECTING PREGNANCY, ANTEPARTUM: ICD-10-CM

## 2024-02-21 DIAGNOSIS — Z34.80 SUPERVISION OF OTHER NORMAL PREGNANCY, ANTEPARTUM: Primary | ICD-10-CM

## 2024-02-21 DIAGNOSIS — O34.219 VBAC (VAGINAL BIRTH AFTER CESAREAN): ICD-10-CM

## 2024-02-21 DIAGNOSIS — O99.891 BACK PAIN AFFECTING PREGNANCY, ANTEPARTUM: ICD-10-CM

## 2024-02-21 PROCEDURE — 99207 PR PRENATAL VISIT: CPT | Performed by: FAMILY MEDICINE

## 2024-02-21 ASSESSMENT — PAIN SCALES - GENERAL: PAINLEVEL: NO PAIN (0)

## 2024-02-21 NOTE — LETTER
February 21, 2024      Lawrence Crooks  [address]   [place] [facility] [place] [facility] [address]        To Whom It May Concern:    Lawrence Crooks is under my medical care for her pregnancy. Her due date is 6/2/24. We request that her mother, Wendy Bustillo, be allowed to travel to the [place] before this date to assist with labor and delivery and postpartum care.       Sincerely,        Brooke Young MD

## 2024-02-21 NOTE — LETTER
February 21, 2024      Lawrence Crooks  [address]   [facility] [address]        To Whom It May Concern:    Lawrence Crooks is under my medical care for her pregnancy. Her due date is 6/2/24. We request that her mother, Jarocho Bustillo, be allowed to travel to the [place] before this date to assist with labor and delivery and postpartum care.       Sincerely,        Brooke Young MD

## 2024-04-11 ENCOUNTER — TELEPHONE (OUTPATIENT)
Dept: FAMILY MEDICINE | Facility: CLINIC | Age: 34
End: 2024-04-11
Payer: COMMERCIAL

## 2024-04-11 NOTE — TELEPHONE ENCOUNTER
General Call    Contacts         Type Contact Phone/Fax    04/11/2024 08:42 AM CDT Phone (Incoming) Lawrence Crooks (Self) [phone] (H)          Reason for Call:  Change Appt time    What are your questions or concerns:  Patient called and asked if her appointment time could be changed next Thursday the 18th to a afternoon appt, patient also stated that Friday morning works as well.       Could we send this information to you in [facility] or would you prefer to receive a phone call?:   Patient would prefer a phone call   Okay to leave a detailed message?: Yes at Home number on file [phone] (home)

## 2024-04-11 NOTE — TELEPHONE ENCOUNTER
I called patient and offered Friday April 19th at 11:40am. Patient is okay with the change. I have rescheduled patients appointment from thursday to Friday.

## 2024-04-19 ENCOUNTER — PRENATAL OFFICE VISIT (OUTPATIENT)
Dept: FAMILY MEDICINE | Facility: CLINIC | Age: 34
End: 2024-04-19
Payer: COMMERCIAL

## 2024-04-19 VITALS
RESPIRATION RATE: 22 BRPM | HEIGHT: 65 IN | TEMPERATURE: 97.9 F | DIASTOLIC BLOOD PRESSURE: 72 MMHG | SYSTOLIC BLOOD PRESSURE: 112 MMHG | OXYGEN SATURATION: 99 % | HEART RATE: 94 BPM | BODY MASS INDEX: 30.32 KG/M2 | WEIGHT: 182 LBS

## 2024-04-19 DIAGNOSIS — Z98.891 HX OF CESAREAN SECTION: ICD-10-CM

## 2024-04-19 DIAGNOSIS — Z34.80 SUPERVISION OF OTHER NORMAL PREGNANCY, ANTEPARTUM: Primary | ICD-10-CM

## 2024-04-19 LAB
ERYTHROCYTE [DISTWIDTH] IN BLOOD BY AUTOMATED COUNT: 12.5 % (ref 10–15)
GLUCOSE 1H P 50 G GLC PO SERPL-MCNC: 111 MG/DL (ref 70–129)
HCT VFR BLD AUTO: 35.4 % (ref 35–47)
HGB BLD-MCNC: 11.9 G/DL (ref 11.7–15.7)
MCH RBC QN AUTO: 28.7 PG (ref 26.5–33)
MCHC RBC AUTO-ENTMCNC: 33.6 G/DL (ref 31.5–36.5)
MCV RBC AUTO: 86 FL (ref 78–100)
PLATELET # BLD AUTO: 309 10E3/UL (ref 150–450)
RBC # BLD AUTO: 4.14 10E6/UL (ref 3.8–5.2)
T PALLIDUM AB SER QL: NONREACTIVE
WBC # BLD AUTO: 7.7 10E3/UL (ref 4–11)

## 2024-04-19 PROCEDURE — 90471 IMMUNIZATION ADMIN: CPT | Performed by: FAMILY MEDICINE

## 2024-04-19 PROCEDURE — 85027 COMPLETE CBC AUTOMATED: CPT | Performed by: FAMILY MEDICINE

## 2024-04-19 PROCEDURE — 99207 PR PRENATAL VISIT: CPT | Performed by: FAMILY MEDICINE

## 2024-04-19 PROCEDURE — 90715 TDAP VACCINE 7 YRS/> IM: CPT | Performed by: FAMILY MEDICINE

## 2024-04-19 PROCEDURE — 36415 COLL VENOUS BLD VENIPUNCTURE: CPT | Performed by: FAMILY MEDICINE

## 2024-04-19 PROCEDURE — 82950 GLUCOSE TEST: CPT | Performed by: FAMILY MEDICINE

## 2024-04-19 PROCEDURE — 86780 TREPONEMA PALLIDUM: CPT | Performed by: FAMILY MEDICINE

## 2024-04-19 ASSESSMENT — PAIN SCALES - GENERAL: PAINLEVEL: NO PAIN (0)

## 2024-04-19 NOTE — PROGRESS NOTES
"Clinic Note - Return OB Visit    Yadira Duffy is a 33 year old  female who presents to clinic for a follow up OB visit. She is currently 33w5d with an estimated date of delivery of 2024 via 1st [facility]. She denies headache, chest pain, shortness of breath, abdominal pain/contractions, vaginal bleeding, or abnormal discharge. She has felt baby move.     New concerns today:   -none - doing well    OB History    Para Term  AB Living   3 2 2 0 0 1   SAB IAB Ectopic Multiple Live Births   0 0 0 0 1      # Outcome Date GA Lbr Tien/2nd Weight Sex Type Anes PTL Lv   3 Current            2 Term 19 40w4d  3.544 kg (7 lb 13 oz) F Vag-Spont         Name: BG YADIRA DUFFY      Apgar1: 7  Apgar5: 9   1 Term 2018    F CS-Unspec   SABRINA      Complications: Failure to Progress in First Stage   1st pregnancy: in [place], thinks was failure to progress, around 7 lbs - 5 days before (39+2 weeks), started with contractions  2nd pregnancy:  successful, healthy pregnancy    Physical exam:  /72 (BP Location: Left arm, Patient Position: Sitting, Cuff Size: Adult Regular)   Pulse 94   Temp 97.9  F (36.6  C) (Temporal)   Resp 22   Ht 1.651 m (5' 5\")   Wt 82.6 kg (182 lb)   LMP 2023 (Approximate)   SpO2 99%   BMI 30.29 kg/m      General: alert, female in no acute distress  Abdomen: gravid uterus at 33 cm, non-tender, FHTs 135 - vertex on handheld US  Extremities: no edema    Assessment/Plan:  Supervision of other normal pregnancy, antepartum   at 33+5 weeks today. Pregnancy complicated by hx . Pt reports planning delivery at [facility]. 1 hr GCT, CBC, syphilis today - Tdap today. Breast pump given today from clinic.     Hx of  section  Hx  delivery with first pregnancy in [place], there is a note that states failure to progress in first stage. Pt had successful  with last delivery and is planning that again this time.       Follow up in 2 " weeks for routine prenatal visit     Brooke Young MD  [facility]    unable to perform

## 2024-04-19 NOTE — PATIENT INSTRUCTIONS
Phone Numbers:  [facility]: [phone]  [facility]: [phone]     When to call or come in to the hospital  If you notice a decrease in your baby's movement.   If your begin to experience contractions that are 5 minutes or less apart and lasting for over 45 seconds, or if contractions are becoming more painful.  If you have any bleeding or leakage of fluids.   If you have a headache not resolved with tylenol, right upper abdominal pain, or sudden onset of swelling.  You know your body best. Never hesitate to call or go to the hospital if something doesn't feel right!    Preparing for the hospital  You re likely feeling anxious as your child s birth approaches. This is normal. To give yourself some peace of mind, pack a bag 3-4 weeks before your due date. Here is a list of things to remember:  Personal care items like toothbrush, hair brush, lip balm, lotion, shampoo, glasses, contacts  Nightgown, bathrobe, slippers  Several pairs of underwear  Comfortable clothes for you to wear home  Camera with new batteries  Cell phone and   Insurance information and any other paperwork needed for your hospital stay  Clothes for baby to wear home  An infant, rear-facing car seat for bringing home your baby (this is required by law)    Managing Labor Pain   There are many ways to manage pain during labor. It can often be done with no anesthesia or strong pain medications. Talk to your health care provider about any options you would like to explore.     Help from Relaxation  Some of these are learned in special classes. Your health care provider can help you find classes. The hospital has a tub you can use during early labor. These methods may be of help to you:   Breathing techniques   A warm tub between contractions   Massage and therapeutic touch by your support person or labor    Reading materials that are comforting or inspiring   Music that is soothing   Hypnosis   Acupuncture and acupressure   Heat and  cold application    Help From Analgesics   Analgesics are mild medications that reduce pain. They can be used along with some relaxation methods. They can give you pain relief without total loss of feeling. They may lessen the pain of strong contractions. You may feel well enough to nap between contractions. They have little effect on your baby if given early in labor. This may be done by injection or by IV.     Help From Anesthetics   Anesthesia involves blockage of all feeling including pain. It can be given in the form of local anesthesia or general anesthesia.  Anesthesia is a type of medication to prevent pain. It is often used in labor. It may numb only one region of your body. This is called regional anesthesia. Or it may let you sleep during surgery. This is called general anesthesia. This type of medication is given by a trained specialist. When possible, regional anesthesia will be used. This is so you can be awake during your baby s birth.     Regional Anesthesia   Regional anesthesia may be used to numb your lower body for a vaginal or  birth. It does not go into your bloodstream. This means that little or none of it will reach your baby. There are two kinds:   Epidural. This is most often given while you sit up or lie on your side. A needle with a flexible tube (catheter) is put in your lower back. The needle is then removed. The anesthetic is sent through the catheter. A pump may be attached. This gives you a constant level of anesthetic. An epidural often only partly affects muscle control. This means you will still be able to push for a vaginal birth.   Spinal. This is most often given in one dose right before delivery. It acts fast. You may sit up or lie down when it is injected. It may affect muscle control in your lower body. This includes the ability to push.    General Anesthesia   General anesthesia lets you sleep and keeps you free of pain during surgery. It may be used for a .  It may be given as an injection. It may be given as an inhaled gas. Or it may be given as both. Delivery often occurs before the medication has reached the baby.    [place] parenting classes [email or web address]  [place] parenting classes [email or web address]-delivery-[email or web address]-information  Free online classes through [employer]

## 2024-04-22 NOTE — PROGRESS NOTES
"Clinic Note - Return OB Visit    Yadira Duffy is a 33 year old  female who presents to clinic for a follow up OB visit. She is currently 34w4d with an estimated date of delivery of 2024 via 1st [facility] [place]. She denies headache, chest pain, shortness of breath, abdominal pain/contractions, vaginal bleeding, or abnormal discharge. She has felt baby move.     New concerns today:   -doing well    OB History    Para Term  AB Living   3 2 2 0 0 1   SAB IAB Ectopic Multiple Live Births   0 0 0 0 1      # Outcome Date GA Lbr Tien/2nd Weight Sex Type Anes PTL Lv   3 Current            2 Term 19 40w4d  3.544 kg (7 lb 13 oz) F Vag-Spont         Name: BG YADIRA DUFFY      Apgar1: 7  Apgar5: 9   1 Term 2018    F CS-Unspec   SABRINA      Complications: Failure to Progress in First Stage   1st pregnancy: in [place], thinks was failure to progress, around 7 lbs - 5 days before (39+2 weeks), started with contractions  2nd pregnancy:  successful, healthy pregnancy    Physical exam:  /70   Pulse 97   Temp 97.5  F (36.4  C) (Temporal)   Resp 15   Ht 1.651 m (5' 5\")   Wt 84.1 kg (185 lb 8 oz)   LMP 2023 (Approximate)   SpO2 98%   BMI 30.87 kg/m      General: alert, female in no acute distress  Abdomen: gravid uterus at 35 cm, non-tender, FHTs 135 - vertex on handheld US  Extremities: no edema    Assessment/Plan:  Supervision of other normal pregnancy, antepartum   at 34+4 weeks today. Pregnancy complicated by hx . Pt reports planning  delivery at [facility]. Tdap given. RSV today.     Hx of  section  Hx  delivery with first pregnancy in [place], there is a note that states failure to progress in first stage. Pt had successful  with last delivery and is planning that again this time.       Follow up in 2 weeks for routine prenatal visit     Brooke Young MD  [facility]   "

## 2024-04-22 NOTE — PATIENT INSTRUCTIONS
Phone Numbers:  [facility]: [phone]  [facility]: [phone]     When to call or come in to the hospital  If you notice a decrease in your baby's movement.   If your begin to experience contractions that are 5 minutes or less apart and lasting for over 45 seconds, or if contractions are becoming more painful.  If you have any bleeding or leakage of fluids.   If you have a headache not resolved with tylenol, right upper abdominal pain, or sudden onset of swelling.  You know your body best. Never hesitate to call or go to the hospital if something doesn't feel right!    Preparing for the hospital  You re likely feeling anxious as your child s birth approaches. This is normal. To give yourself some peace of mind, pack a bag 3-4 weeks before your due date. Here is a list of things to remember:  Personal care items like toothbrush, hair brush, lip balm, lotion, shampoo, glasses, contacts  Nightgown, bathrobe, slippers  Several pairs of underwear  Comfortable clothes for you to wear home  Camera with new batteries  Cell phone and   Insurance information and any other paperwork needed for your hospital stay  Clothes for baby to wear home  An infant, rear-facing car seat for bringing home your baby (this is required by law)    Managing Labor Pain   There are many ways to manage pain during labor. It can often be done with no anesthesia or strong pain medications. Talk to your health care provider about any options you would like to explore.     Help from Relaxation  Some of these are learned in special classes. Your health care provider can help you find classes. The hospital has a tub you can use during early labor. These methods may be of help to you:   Breathing techniques   A warm tub between contractions   Massage and therapeutic touch by your support person or labor    Reading materials that are comforting or inspiring   Music that is soothing   Hypnosis   Acupuncture and acupressure   Heat and  cold application    Help From Analgesics   Analgesics are mild medications that reduce pain. They can be used along with some relaxation methods. They can give you pain relief without total loss of feeling. They may lessen the pain of strong contractions. You may feel well enough to nap between contractions. They have little effect on your baby if given early in labor. This may be done by injection or by IV.     Help From Anesthetics   Anesthesia involves blockage of all feeling including pain. It can be given in the form of local anesthesia or general anesthesia.  Anesthesia is a type of medication to prevent pain. It is often used in labor. It may numb only one region of your body. This is called regional anesthesia. Or it may let you sleep during surgery. This is called general anesthesia. This type of medication is given by a trained specialist. When possible, regional anesthesia will be used. This is so you can be awake during your baby s birth.     Regional Anesthesia   Regional anesthesia may be used to numb your lower body for a vaginal or  birth. It does not go into your bloodstream. This means that little or none of it will reach your baby. There are two kinds:   Epidural. This is most often given while you sit up or lie on your side. A needle with a flexible tube (catheter) is put in your lower back. The needle is then removed. The anesthetic is sent through the catheter. A pump may be attached. This gives you a constant level of anesthetic. An epidural often only partly affects muscle control. This means you will still be able to push for a vaginal birth.   Spinal. This is most often given in one dose right before delivery. It acts fast. You may sit up or lie down when it is injected. It may affect muscle control in your lower body. This includes the ability to push.    General Anesthesia   General anesthesia lets you sleep and keeps you free of pain during surgery. It may be used for a .  It may be given as an injection. It may be given as an inhaled gas. Or it may be given as both. Delivery often occurs before the medication has reached the baby.    [place] parenting classes [email or web address]  [place] parenting classes [email or web address]-[email or web address]-information  Free online classes through [employer]

## 2024-04-25 ENCOUNTER — PRENATAL OFFICE VISIT (OUTPATIENT)
Dept: FAMILY MEDICINE | Facility: CLINIC | Age: 34
End: 2024-04-25
Payer: COMMERCIAL

## 2024-04-25 VITALS
DIASTOLIC BLOOD PRESSURE: 70 MMHG | HEART RATE: 97 BPM | TEMPERATURE: 97.5 F | HEIGHT: 65 IN | RESPIRATION RATE: 15 BRPM | WEIGHT: 185.5 LBS | OXYGEN SATURATION: 98 % | BODY MASS INDEX: 30.91 KG/M2 | SYSTOLIC BLOOD PRESSURE: 120 MMHG

## 2024-04-25 DIAGNOSIS — Z34.80 SUPERVISION OF OTHER NORMAL PREGNANCY, ANTEPARTUM: Primary | ICD-10-CM

## 2024-04-25 DIAGNOSIS — Z98.891 HX OF CESAREAN SECTION: ICD-10-CM

## 2024-04-25 PROCEDURE — 99207 PR PRENATAL VISIT: CPT | Performed by: FAMILY MEDICINE

## 2024-04-25 PROCEDURE — 96372 THER/PROPH/DIAG INJ SC/IM: CPT | Performed by: FAMILY MEDICINE

## 2024-04-25 PROCEDURE — 90678 RSV VACC PREF BIVALENT IM: CPT | Performed by: FAMILY MEDICINE

## 2024-04-25 ASSESSMENT — PAIN SCALES - GENERAL: PAINLEVEL: NO PAIN (0)

## 2024-05-09 ENCOUNTER — PATIENT OUTREACH (OUTPATIENT)
Dept: FAMILY MEDICINE | Facility: CLINIC | Age: 34
End: 2024-05-09

## 2024-05-09 PROBLEM — R87.810 CERVICAL HIGH RISK HPV (HUMAN PAPILLOMAVIRUS) TEST POSITIVE: Status: ACTIVE | Noted: 2023-06-06

## 2024-05-09 NOTE — TELEPHONE ENCOUNTER
Hi Dr. Young,   34 yo that is due for a cotest here soon but is now pregnant and has a EDC of 06/2/24 would you like her to return for this cotest 6 weeks postpartum? Please advise.     Pap Hx:  05/30/23 NIL Pap, +HR HPV (not 16 or 18) Plan cotest in 1 year due 05/30/24 06/6/23 Pt was advised.

## 2024-05-10 ENCOUNTER — PRENATAL OFFICE VISIT (OUTPATIENT)
Dept: FAMILY MEDICINE | Facility: CLINIC | Age: 34
End: 2024-05-10
Payer: COMMERCIAL

## 2024-05-10 VITALS
RESPIRATION RATE: 16 BRPM | OXYGEN SATURATION: 98 % | WEIGHT: 186.3 LBS | TEMPERATURE: 96.3 F | DIASTOLIC BLOOD PRESSURE: 78 MMHG | SYSTOLIC BLOOD PRESSURE: 120 MMHG | BODY MASS INDEX: 31 KG/M2 | HEART RATE: 112 BPM

## 2024-05-10 DIAGNOSIS — Z98.891 HX OF CESAREAN SECTION: ICD-10-CM

## 2024-05-10 DIAGNOSIS — Z34.80 SUPERVISION OF OTHER NORMAL PREGNANCY, ANTEPARTUM: Primary | ICD-10-CM

## 2024-05-10 DIAGNOSIS — O34.219 VBAC (VAGINAL BIRTH AFTER CESAREAN): ICD-10-CM

## 2024-05-10 PROCEDURE — 99207 PR PRENATAL VISIT: CPT | Performed by: FAMILY MEDICINE

## 2024-05-10 PROCEDURE — 87653 STREP B DNA AMP PROBE: CPT | Performed by: FAMILY MEDICINE

## 2024-05-10 ASSESSMENT — PAIN SCALES - GENERAL: PAINLEVEL: NO PAIN (0)

## 2024-05-10 NOTE — PATIENT INSTRUCTIONS
Phone Numbers:  [facility]: [phone]  [facility]: [phone]     When to call or come in to the hospital  If you notice a decrease in your baby's movement.   If your begin to experience contractions that are 5 minutes or less apart and lasting for over 45 seconds, or if contractions are becoming more painful.  If you have any bleeding or leakage of fluids.   If you have a headache not resolved with tylenol, right upper abdominal pain, or sudden onset of swelling.  You know your body best. Never hesitate to call or go to the hospital if something doesn't feel right!    Preparing for the hospital  You re likely feeling anxious as your child s birth approaches. This is normal. To give yourself some peace of mind, pack a bag 3-4 weeks before your due date. Here is a list of things to remember:  Personal care items like toothbrush, hair brush, lip balm, lotion, shampoo, glasses, contacts  Nightgown, bathrobe, slippers  Several pairs of underwear  Comfortable clothes for you to wear home  Camera with new batteries  Cell phone and   Insurance information and any other paperwork needed for your hospital stay  Clothes for baby to wear home  An infant, rear-facing car seat for bringing home your baby (this is required by law)    What Is Group B Strep?   Group B strep (streptococcus) is a common bacteria. It can grow in a woman s vagina, rectum, or urinary tract. It is almost always harmless in adults. But in rare cases, a woman who has group B strep can infect her baby during the birth. Infection can cause serious illness in the . The good news is that treating the mother during labor reduces the risk of the baby becoming infected. And if a  is infected, the infection can be treated. You will be screened for Group B strep at 35-36 weeks gestation.    Facts about group B strep   Learning more about group B strep can help you understand how testing and treatment can help. Here are some basic  facts about group B strep:   It is not a sexually transmitted disease.   It is not the same as strep throat. (This is caused by group A strep.)   It often has no symptoms and may cause no problems in adults.   Group B strep can be transmitted during vaginal delivery. It cannot be passed during  (surgical) birth.   A mother with group B strep rarely infects her . (Infection occurs only about 1% to 2% of the time.)   When a mother is treated during labor and delivery, her baby almost never becomes infected.   Certain factors during pregnancy increase the risk of a baby becoming infected.    Possible effects on your baby   Group B strep can infect the blood. It can also cause inflammation of the baby s lungs, brain, or spinal cord. Long-term effects can include blindness, deafness, mental retardation, or cerebral palsy. And in rare cases, infection causes death. Infection is most often detected soon after the baby is born.     How your baby may become infected   Group B strep often lives in the vagina or rectum. If the amniotic sac breaks early, bacteria from the vagina can travel to the uterus, reaching the baby. Or, as the baby passes through the birth canal, it can come in contact with the bacteria. In rare cases, group B strep can also be passed to the baby after delivery. This is called late-onset group B strep. The source of this type of infection is not well understood. But some experts believe that it happens if the baby is exposed to group B strep in the home, from the parents or siblings, or in the community.     What increases the risk?   Certain risk factors increase the chance that a baby will be infected. They include:   Breaking or leaking of the amniotic sac earlier than 37 weeks gestation   Labor earlier than 37 weeks gestation   Breaking of the amniotic sac more than 18 hours before labor begins   Fever during labor   A urinary tract infection with group B strep at any point in the  pregnancy   A previous baby born with a group B strep infection    BaBaby Feeding in the Hospital: Information, Support and      Resources    As you prepare for the birth of your child, you will want to consider options for feeding your baby cluding breast-feeding and/or baby formula. The American Academy of Pediatrics recommends exclusive breast-feeding for the first six months (although any amount of breast-feeding is beneficial).  However, we also understand that breast-feeding is  a personal choice and not for everyone. Whether or not you choose to breast-feed, your decision will be respected by our staff.        There are numerous benefits of breast-feeding; here are a few to consider:  Provides antibodies to protect your baby from infections and diseases  The cost: formula can cost over $1,500 per year  Convenience, no warming up or sterilizing bottles and supplies  The physical contact with breastfeeding can make babies feel secure, warm and comforted    Whatever my feeding choice, what can I expect after I deliver my baby?  Your baby will usually be placed skin-to-skin immediately following birth. The skin to skin contact between you and your baby will be a special and memorable time. The bonding and attachment comforts your baby and has a positive effect on baby s brain development.   Having your baby  room in  with you also helps you start to learn your baby s body rhythms and sleep cycle.    You will also begin to learn your baby s cues (signals) that he or she is ready to feed.    When do I start to feed my baby?   As soon as possible after your baby s birth, you will be encouraged to begin feeding. In the first couple of weeks, your baby will eat often. Breastfeeding babies usually eat at least 8 times in 24 hours. Babies fed formula usually eat at least 7 times in 24 hours.      Breast-feeding tips:  Get comfortable and use pillows for support.  Have your baby at the level of your breast, facing you,   tummy to tummy.     Touch your nipple to your baby s lips so your baby s mouth opens wide (rooting reflex).  Aim the nipple toward the roof of your baby s mouth. When your baby opens his or her mouth, pull your baby toward your breast to help your baby  latch on  to your nipple and much of the areola area.  Hand expressing your breast milk can assist with latching your baby to your breast, if needed.  Ask for help, breastfeeding may seem awkward or uncomfortable at first, this is normal. There are numerous resources available.  Mixing breastfeeding and formula can interfere with how you begin building your milk supply. It can impact how you and your baby learn to breastfeeding together and alter the natural growth of  good  bacteria in your baby s stomach.  Ask your nurse to show you how to hand express.   Breast milk can be kept in the refrigerator orfreezer for later use.     Hospital Resources:  [facility] Clin[facility] located at [facility], [facility] and [facility]  Call: [phone].  Inpatient support  Outpatient appointments  Telephone consultation  Breast-feeding classes available through [employer]      Online Resources:  [email or web address] sign up for free online weekly e-mail  [email or web address]  Breastfeedingmadesimple.com  Llli.org (La Leche League)  Normalfed.com  Womenshealth.gov/breastfeeding  Workandpump.com    Breast-feeding Supplies & Pumps:  Talk to your insurance provider or WIC (Women, Infants and Children) to learn more about options available to you. Recent health insurance changes may include additional coverage for supplies and pumps.    Public Health:  Women, Infants and Children Nutrition program (WIC): provides breast-feeding support and education in addition to formal feeding moms. 849-VNJ-7839 or [email or web address]/wic    Family Health Home Visiting: Public [facility] Nurse home visits are available. Talk to  your provider to see if you qualify. Most [facility] have a program available.    Additional Resources:  La Leche League is an international, nonprofit, nonsectarian organization offering information, education, and support to mothers who want to breast-feed their babies. Local groups offer phone help and monthly meetings. Visit [employer].org or UpCityli.org and us the  Find local support  drop down menu or click on the  Resources  tab.    [place] Breastfeeding Resources: 7-194-444-BABY (4214) toll free    National Breastfeeding Help Line trained breastfeeding peer counselors can help answer common breast-feeding questions by phone. Monday-Friday: English/Ugandan  8-467- [phone] toll free, [phone] (TTY)

## 2024-05-10 NOTE — PROGRESS NOTES
Clinic Note - Return OB Visit    Yadira Duffy is a 33 year old  female who presents to clinic for a follow up OB visit. She is currently 36w5d with an estimated date of delivery of 2024 via 1st tri US. She denies headache, chest pain, shortness of breath, abdominal pain/contractions, vaginal bleeding, or abnormal discharge. She has felt baby move.     New concerns today:   -low back pain, tolerable    OB History    Para Term  AB Living   3 2 2 0 0 1   SAB IAB Ectopic Multiple Live Births   0 0 0 0 1      # Outcome Date GA Lbr Tien/2nd Weight Sex Type Anes PTL Lv   3 Current            2 Term 19 40w4d  3.544 kg (7 lb 13 oz) F Vag-Spont         Name: BG YADIRA DUFFY      Apgar1: 7  Apgar5: 9   1 Term 2018    F CS-Unspec   SABRINA      Complications: Failure to Progress in First Stage   1st pregnancy: in [place], thinks was failure to progress, around 7 lbs - 5 days before (39+2 weeks), started with contractions  2nd pregnancy:  successful, healthy pregnancy    Physical exam:  /78 (BP Location: Left arm, Patient Position: Sitting, Cuff Size: Adult Regular)   Pulse 112   Temp (!) 96.3  F (35.7  C) (Temporal)   Resp 16   Wt 84.5 kg (186 lb 4.8 oz)   LMP 2023 (Approximate)   SpO2 98%   BMI 31.00 kg/m      General: alert, female in no acute distress  Abdomen: gravid uterus at 36 cm, non-tender, FHTs 150 - vertex on handheld US  Extremities: no edema    Assessment/Plan:  Supervision of other normal pregnancy, antepartum   at 36+5 weeks today. Pregnancy complicated by hx . Pt reports planning  delivery at [facility]. Tdap and RSV given. GBS swab done today.    Hx of  section  Hx  delivery with first pregnancy in [place], there is a note that states failure to progress in first stage. Pt had successful  with last delivery and is planning that again this time.     -planning WW, epidural, both breast and bottle feeding,  undecided on circumcision (will discuss with )      Follow up in 1 week for routine prenatal visit     Brooke Young MD  [facility]

## 2024-05-11 LAB — GP B STREP DNA SPEC QL NAA+PROBE: NEGATIVE

## 2024-05-16 ENCOUNTER — PRENATAL OFFICE VISIT (OUTPATIENT)
Dept: FAMILY MEDICINE | Facility: CLINIC | Age: 34
End: 2024-05-16
Payer: COMMERCIAL

## 2024-05-16 VITALS
SYSTOLIC BLOOD PRESSURE: 114 MMHG | HEART RATE: 87 BPM | WEIGHT: 187.9 LBS | OXYGEN SATURATION: 100 % | DIASTOLIC BLOOD PRESSURE: 74 MMHG | TEMPERATURE: 97.2 F | HEIGHT: 66 IN | BODY MASS INDEX: 30.2 KG/M2 | RESPIRATION RATE: 22 BRPM

## 2024-05-16 DIAGNOSIS — O34.219 VBAC (VAGINAL BIRTH AFTER CESAREAN): ICD-10-CM

## 2024-05-16 DIAGNOSIS — Z34.80 SUPERVISION OF OTHER NORMAL PREGNANCY, ANTEPARTUM: Primary | ICD-10-CM

## 2024-05-16 DIAGNOSIS — Z98.891 HX OF CESAREAN SECTION: ICD-10-CM

## 2024-05-16 PROCEDURE — 99207 PR PRENATAL VISIT: CPT | Performed by: FAMILY MEDICINE

## 2024-05-16 ASSESSMENT — PAIN SCALES - GENERAL: PAINLEVEL: SEVERE PAIN (6)

## 2024-05-16 NOTE — PROGRESS NOTES
"Clinic Note - Return OB Visit    Yadira Duffy is a 33 year old  female who presents to clinic for a follow up OB visit. She is currently 37w4d with an estimated date of delivery of 2024 via 1st [facility] US. She denies headache, chest pain, shortness of breath, abdominal pain/contractions, vaginal bleeding, or abnormal discharge. She has felt baby move.     New concerns today:   -low back pain, overall the same    OB History    Para Term  AB Living   3 2 2 0 0 1   SAB IAB Ectopic Multiple Live Births   0 0 0 0 1      # Outcome Date GA Lbr Tien/2nd Weight Sex Type Anes PTL Lv   3 Current            2 Term 19 40w4d  3.544 kg (7 lb 13 oz) F Vag-Spont         Name: BG YADIRA DUFFY      Apgar1: 7  Apgar5: 9   1 Term 2018    F CS-Unspec   SABRINA      Complications: Failure to Progress in First Stage   1st pregnancy: in [place], thinks was failure to progress, around 7 lbs - 5 days before (39+2 weeks), started with contractions  2nd pregnancy:  successful, healthy pregnancy    Physical exam:  /74 (BP Location: Right arm, Patient Position: Sitting, Cuff Size: Adult Regular)   Pulse 87   Temp 97.2  F (36.2  C) (Temporal)   Resp 22   Ht 1.669 m (5' 5.71\")   Wt 85.2 kg (187 lb 14.4 oz)   LMP 2023 (Approximate)   SpO2 100%   BMI 30.60 kg/m      General: alert, female in no acute distress  Abdomen: gravid uterus at 37 cm, non-tender, FHTs 145 - vertex on handheld US  Extremities: no edema    Assessment/Plan:  Supervision of other normal pregnancy, antepartum   at 37+4 weeks today. Pregnancy complicated by hx . Pt reports planning  delivery at [facility]. Tdap and RSV given. GBS negative.    Hx of  section  Hx  delivery with first pregnancy in [place], there is a note that states failure to progress in first stage. Pt had successful  with last delivery and is planning that again this time.     -planning WW, epidural, both breast " and bottle feeding, undecided on circumcision (will discuss with )      Follow up in 1 week for routine prenatal visit     Brooke Young MD  [facility]

## 2024-05-24 ENCOUNTER — PRENATAL OFFICE VISIT (OUTPATIENT)
Dept: FAMILY MEDICINE | Facility: CLINIC | Age: 34
End: 2024-05-24
Payer: COMMERCIAL

## 2024-05-24 VITALS
WEIGHT: 191.9 LBS | BODY MASS INDEX: 31.97 KG/M2 | RESPIRATION RATE: 18 BRPM | OXYGEN SATURATION: 99 % | HEART RATE: 87 BPM | TEMPERATURE: 97.3 F | SYSTOLIC BLOOD PRESSURE: 120 MMHG | DIASTOLIC BLOOD PRESSURE: 60 MMHG | HEIGHT: 65 IN

## 2024-05-24 DIAGNOSIS — Z34.80 SUPERVISION OF OTHER NORMAL PREGNANCY, ANTEPARTUM: Primary | ICD-10-CM

## 2024-05-24 DIAGNOSIS — O34.219 VBAC (VAGINAL BIRTH AFTER CESAREAN): ICD-10-CM

## 2024-05-24 DIAGNOSIS — Z98.891 HX OF CESAREAN SECTION: ICD-10-CM

## 2024-05-24 PROCEDURE — 59426 ANTEPARTUM CARE ONLY: CPT | Performed by: FAMILY MEDICINE

## 2024-05-24 ASSESSMENT — PAIN SCALES - GENERAL: PAINLEVEL: EXTREME PAIN (8)

## 2024-05-24 NOTE — PROGRESS NOTES
"Clinic Note - Return OB Visit    Yadira Duffy is a 33 year old  female who presents to clinic for a follow up OB visit. She is currently 38w5d with an estimated date of delivery of 2024 via 1st [facility] US. She denies headache, chest pain, shortness of breath, abdominal pain/contractions, vaginal bleeding, or abnormal discharge. She has felt baby move.     New concerns today:   -back pain - discussed some tips for management    OB History    Para Term  AB Living   3 2 2 0 0 1   SAB IAB Ectopic Multiple Live Births   0 0 0 0 1      # Outcome Date GA Lbr Tien/2nd Weight Sex Type Anes PTL Lv   3 Current            2 Term 19 40w4d  3.544 kg (7 lb 13 oz) F Vag-Spont         Name: BG YADIRA DUFFY      Apgar1: 7  Apgar5: 9   1 Term 2018    F CS-Unspec   SABRINA      Complications: Failure to Progress in First Stage   1st pregnancy: in [place], thinks was failure to progress, around 7 lbs - 5 days before (39+2 weeks), started with contractions  2nd pregnancy:  successful, healthy pregnancy    Physical exam:  /60   Pulse 87   Temp 97.3  F (36.3  C) (Temporal)   Resp 18   Ht 1.651 m (5' 5\")   Wt 87 kg (191 lb 14.4 oz)   LMP 2023 (Approximate)   SpO2 99%   BMI 31.93 kg/m      General: alert, female in no acute distress  Abdomen: gravid uterus at 40 cm, non-tender, FHTs 135 - vertex on handheld US  Extremities: no edema    Assessment/Plan:  Supervision of other normal pregnancy, antepartum   at 38+5 weeks today. Pregnancy complicated by hx . Pt reports planning  delivery at [facility]. Tdap and RSV given. GBS negative.     Hx of  section  Hx  delivery with first pregnancy in [place], there is a note that states failure to progress in first stage. Pt had successful  with last delivery and is planning that again this time.     -planning WW, epidural, both breast and bottle feeding, wanting circumcision      Follow up in 1 week " for routine prenatal visit     Brooke Young MD  [facility]

## 2024-05-24 NOTE — PATIENT INSTRUCTIONS
Phone Numbers:  [facility]: [phone]  [facility]: [phone]    For the back:  -tylenol is ok to use, especially when the pain is more severe  -ice/heat to the area  -stretching  -massage can be really helpful     When to call or come in to the hospital  If you notice a decrease in your baby's movement.   If your begin to experience contractions that are 5 minutes or less apart and lasting for over 45 seconds, or if contractions are becoming more painful.  If you have any bleeding or leakage of fluids.   If you have a headache not resolved with tylenol, right upper abdominal pain, or sudden onset of swelling.  You know your body best. Never hesitate to call or go to the hospital if something doesn't feel right!    Preparing for the hospital  You re likely feeling anxious as your child s birth approaches. This is normal. To give yourself some peace of mind, pack a bag 3-4 weeks before your due date. Here is a list of things to remember:  Personal care items like toothbrush, hair brush, lip balm, lotion, shampoo, glasses, contacts  Nightgown, bathrobe, slippers  Several pairs of underwear  Comfortable clothes for you to wear home  Camera with new batteries  Cell phone and   Insurance information and any other paperwork needed for your hospital stay  Clothes for baby to wear home  An infant, rear-facing car seat for bringing home your baby (this is required by law)

## 2024-05-29 ENCOUNTER — HOSPITAL ENCOUNTER (INPATIENT)
Facility: CLINIC | Age: 34
LOS: 2 days | Discharge: HOME | End: 2024-05-31
Attending: FAMILY MEDICINE | Admitting: FAMILY MEDICINE
Payer: COMMERCIAL

## 2024-05-29 ENCOUNTER — ANESTHESIA (OUTPATIENT)
Dept: OBGYN | Facility: CLINIC | Age: 34
End: 2024-05-29
Payer: COMMERCIAL

## 2024-05-29 ENCOUNTER — ANESTHESIA EVENT (OUTPATIENT)
Dept: OBGYN | Facility: CLINIC | Age: 34
End: 2024-05-29
Payer: COMMERCIAL

## 2024-05-29 PROBLEM — Z36.89 ENCOUNTER FOR TRIAGE IN PREGNANT PATIENT: Status: ACTIVE | Noted: 2024-05-29

## 2024-05-29 PROBLEM — O47.9 UTERINE CONTRACTIONS: Status: ACTIVE | Noted: 2024-05-29

## 2024-05-29 LAB
ABO/RH(D): NORMAL
ANTIBODY SCREEN: NEGATIVE
HGB BLD-MCNC: 12.8 G/DL (ref 11.7–15.7)
HOLD SPECIMEN: NORMAL
HOLD SPECIMEN: NORMAL
PLATELET # BLD AUTO: 276 10E3/UL (ref 150–450)
SPECIMEN EXPIRATION DATE: NORMAL

## 2024-05-29 PROCEDURE — 258N000003 HC RX IP 258 OP 636: Performed by: FAMILY MEDICINE

## 2024-05-29 PROCEDURE — 250N000013 HC RX MED GY IP 250 OP 250 PS 637: Performed by: FAMILY MEDICINE

## 2024-05-29 PROCEDURE — 250N000009 HC RX 250: Performed by: FAMILY MEDICINE

## 2024-05-29 PROCEDURE — 250N000011 HC RX IP 250 OP 636: Performed by: STUDENT IN AN ORGANIZED HEALTH CARE EDUCATION/TRAINING PROGRAM

## 2024-05-29 PROCEDURE — 120N000001 HC R&B MED SURG/OB

## 2024-05-29 PROCEDURE — 0UQMXZZ REPAIR VULVA, EXTERNAL APPROACH: ICD-10-PCS | Performed by: OBSTETRICS & GYNECOLOGY

## 2024-05-29 PROCEDURE — 86780 TREPONEMA PALLIDUM: CPT | Performed by: FAMILY MEDICINE

## 2024-05-29 PROCEDURE — 999N000157 HC STATISTIC RCP TIME EA 10 MIN

## 2024-05-29 PROCEDURE — 36415 COLL VENOUS BLD VENIPUNCTURE: CPT | Performed by: FAMILY MEDICINE

## 2024-05-29 PROCEDURE — 370N000003 HC ANESTHESIA WARD SERVICE: Performed by: STUDENT IN AN ORGANIZED HEALTH CARE EDUCATION/TRAINING PROGRAM

## 2024-05-29 PROCEDURE — 999N000016 HC STATISTIC ATTENDANCE AT DELIVERY

## 2024-05-29 PROCEDURE — 250N000011 HC RX IP 250 OP 636: Performed by: FAMILY MEDICINE

## 2024-05-29 PROCEDURE — 3E0R3BZ INTRODUCTION OF ANESTHETIC AGENT INTO SPINAL CANAL, PERCUTANEOUS APPROACH: ICD-10-PCS | Performed by: STUDENT IN AN ORGANIZED HEALTH CARE EDUCATION/TRAINING PROGRAM

## 2024-05-29 PROCEDURE — 85018 HEMOGLOBIN: CPT | Performed by: FAMILY MEDICINE

## 2024-05-29 PROCEDURE — 722N000001 HC LABOR CARE VAGINAL DELIVERY SINGLE

## 2024-05-29 PROCEDURE — 00HU33Z INSERTION OF INFUSION DEVICE INTO SPINAL CANAL, PERCUTANEOUS APPROACH: ICD-10-PCS | Performed by: STUDENT IN AN ORGANIZED HEALTH CARE EDUCATION/TRAINING PROGRAM

## 2024-05-29 PROCEDURE — 99221 1ST HOSP IP/OBS SF/LOW 40: CPT | Performed by: FAMILY MEDICINE

## 2024-05-29 PROCEDURE — 85049 AUTOMATED PLATELET COUNT: CPT | Performed by: FAMILY MEDICINE

## 2024-05-29 PROCEDURE — 86900 BLOOD TYPING SEROLOGIC ABO: CPT | Performed by: FAMILY MEDICINE

## 2024-05-29 RX ORDER — TRANEXAMIC ACID 10 MG/ML
1 INJECTION, SOLUTION INTRAVENOUS EVERY 30 MIN PRN
Status: DISCONTINUED | OUTPATIENT
Start: 2024-05-29 | End: 2024-05-31 | Stop reason: HOSPADM

## 2024-05-29 RX ORDER — MISOPROSTOL 200 UG/1
800 TABLET ORAL
Status: DISCONTINUED | OUTPATIENT
Start: 2024-05-29 | End: 2024-05-29 | Stop reason: HOSPADM

## 2024-05-29 RX ORDER — BISACODYL 10 MG
10 SUPPOSITORY, RECTAL RECTAL DAILY PRN
Status: DISCONTINUED | OUTPATIENT
Start: 2024-05-29 | End: 2024-05-31 | Stop reason: HOSPADM

## 2024-05-29 RX ORDER — MISOPROSTOL 200 UG/1
800 TABLET ORAL
Status: DISCONTINUED | OUTPATIENT
Start: 2024-05-29 | End: 2024-05-29

## 2024-05-29 RX ORDER — MISOPROSTOL 200 UG/1
400 TABLET ORAL
Status: DISCONTINUED | OUTPATIENT
Start: 2024-05-29 | End: 2024-05-31 | Stop reason: HOSPADM

## 2024-05-29 RX ORDER — LOPERAMIDE HCL 2 MG
2 CAPSULE ORAL
Status: DISCONTINUED | OUTPATIENT
Start: 2024-05-29 | End: 2024-05-29

## 2024-05-29 RX ORDER — IBUPROFEN 800 MG/1
800 TABLET, FILM COATED ORAL EVERY 6 HOURS PRN
Status: DISCONTINUED | OUTPATIENT
Start: 2024-05-29 | End: 2024-05-29

## 2024-05-29 RX ORDER — ACETAMINOPHEN 325 MG/1
650 TABLET ORAL EVERY 4 HOURS PRN
Status: DISCONTINUED | OUTPATIENT
Start: 2024-05-29 | End: 2024-05-29

## 2024-05-29 RX ORDER — IBUPROFEN 800 MG/1
800 TABLET, FILM COATED ORAL EVERY 6 HOURS PRN
Status: DISCONTINUED | OUTPATIENT
Start: 2024-05-29 | End: 2024-05-31 | Stop reason: HOSPADM

## 2024-05-29 RX ORDER — HYDROCORTISONE 25 MG/G
CREAM TOPICAL 3 TIMES DAILY PRN
Status: DISCONTINUED | OUTPATIENT
Start: 2024-05-29 | End: 2024-05-31 | Stop reason: HOSPADM

## 2024-05-29 RX ORDER — METHYLERGONOVINE MALEATE 0.2 MG/ML
200 INJECTION INTRAVENOUS
Status: DISCONTINUED | OUTPATIENT
Start: 2024-05-29 | End: 2024-05-29

## 2024-05-29 RX ORDER — OXYTOCIN/0.9 % SODIUM CHLORIDE 30/500 ML
100-340 PLASTIC BAG, INJECTION (ML) INTRAVENOUS CONTINUOUS PRN
Status: DISCONTINUED | OUTPATIENT
Start: 2024-05-29 | End: 2024-05-31 | Stop reason: HOSPADM

## 2024-05-29 RX ORDER — NALOXONE HYDROCHLORIDE 0.4 MG/ML
0.2 INJECTION, SOLUTION INTRAMUSCULAR; INTRAVENOUS; SUBCUTANEOUS
Status: DISCONTINUED | OUTPATIENT
Start: 2024-05-29 | End: 2024-05-29 | Stop reason: HOSPADM

## 2024-05-29 RX ORDER — CARBOPROST TROMETHAMINE 250 UG/ML
250 INJECTION, SOLUTION INTRAMUSCULAR
Status: DISCONTINUED | OUTPATIENT
Start: 2024-05-29 | End: 2024-05-29

## 2024-05-29 RX ORDER — TRANEXAMIC ACID 10 MG/ML
1 INJECTION, SOLUTION INTRAVENOUS EVERY 30 MIN PRN
Status: DISCONTINUED | OUTPATIENT
Start: 2024-05-29 | End: 2024-05-29

## 2024-05-29 RX ORDER — KETOROLAC TROMETHAMINE 30 MG/ML
30 INJECTION, SOLUTION INTRAMUSCULAR; INTRAVENOUS
Status: COMPLETED | OUTPATIENT
Start: 2024-05-29 | End: 2024-05-29

## 2024-05-29 RX ORDER — DOCUSATE SODIUM 100 MG/1
100 CAPSULE, LIQUID FILLED ORAL DAILY
Status: DISCONTINUED | OUTPATIENT
Start: 2024-05-30 | End: 2024-05-29

## 2024-05-29 RX ORDER — OXYTOCIN/0.9 % SODIUM CHLORIDE 30/500 ML
340 PLASTIC BAG, INJECTION (ML) INTRAVENOUS CONTINUOUS PRN
Status: DISCONTINUED | OUTPATIENT
Start: 2024-05-29 | End: 2024-05-29

## 2024-05-29 RX ORDER — FENTANYL/ROPIVACAINE/NS/PF 2MCG/ML-.1
PLASTIC BAG, INJECTION (ML) EPIDURAL
Status: DISCONTINUED | OUTPATIENT
Start: 2024-05-29 | End: 2024-05-29 | Stop reason: HOSPADM

## 2024-05-29 RX ORDER — NALOXONE HYDROCHLORIDE 0.4 MG/ML
0.4 INJECTION, SOLUTION INTRAMUSCULAR; INTRAVENOUS; SUBCUTANEOUS
Status: DISCONTINUED | OUTPATIENT
Start: 2024-05-29 | End: 2024-05-29 | Stop reason: HOSPADM

## 2024-05-29 RX ORDER — MISOPROSTOL 200 UG/1
400 TABLET ORAL
Status: DISCONTINUED | OUTPATIENT
Start: 2024-05-29 | End: 2024-05-29 | Stop reason: HOSPADM

## 2024-05-29 RX ORDER — METHYLERGONOVINE MALEATE 0.2 MG/ML
200 INJECTION INTRAVENOUS
Status: DISCONTINUED | OUTPATIENT
Start: 2024-05-29 | End: 2024-05-29 | Stop reason: HOSPADM

## 2024-05-29 RX ORDER — OXYTOCIN 10 [USP'U]/ML
10 INJECTION, SOLUTION INTRAMUSCULAR; INTRAVENOUS
Status: DISCONTINUED | OUTPATIENT
Start: 2024-05-29 | End: 2024-05-31 | Stop reason: HOSPADM

## 2024-05-29 RX ORDER — LOPERAMIDE HCL 2 MG
2 CAPSULE ORAL
Status: DISCONTINUED | OUTPATIENT
Start: 2024-05-29 | End: 2024-05-31 | Stop reason: HOSPADM

## 2024-05-29 RX ORDER — LOPERAMIDE HCL 2 MG
4 CAPSULE ORAL
Status: DISCONTINUED | OUTPATIENT
Start: 2024-05-29 | End: 2024-05-29

## 2024-05-29 RX ORDER — MODIFIED LANOLIN
OINTMENT (GRAM) TOPICAL
Status: DISCONTINUED | OUTPATIENT
Start: 2024-05-29 | End: 2024-05-29

## 2024-05-29 RX ORDER — FENTANYL/ROPIVACAINE/NS/PF 2MCG/ML-.1
PLASTIC BAG, INJECTION (ML) EPIDURAL PRN
Status: DISCONTINUED | OUTPATIENT
Start: 2024-05-29 | End: 2024-05-30

## 2024-05-29 RX ORDER — MISOPROSTOL 200 UG/1
400 TABLET ORAL
Status: DISCONTINUED | OUTPATIENT
Start: 2024-05-29 | End: 2024-05-29

## 2024-05-29 RX ORDER — TRANEXAMIC ACID 10 MG/ML
1 INJECTION, SOLUTION INTRAVENOUS EVERY 30 MIN PRN
Status: DISCONTINUED | OUTPATIENT
Start: 2024-05-29 | End: 2024-05-29 | Stop reason: HOSPADM

## 2024-05-29 RX ORDER — HYDROCORTISONE 25 MG/G
CREAM TOPICAL 3 TIMES DAILY PRN
Status: DISCONTINUED | OUTPATIENT
Start: 2024-05-29 | End: 2024-05-29

## 2024-05-29 RX ORDER — OXYTOCIN/0.9 % SODIUM CHLORIDE 30/500 ML
340 PLASTIC BAG, INJECTION (ML) INTRAVENOUS CONTINUOUS PRN
Status: DISCONTINUED | OUTPATIENT
Start: 2024-05-29 | End: 2024-05-29 | Stop reason: HOSPADM

## 2024-05-29 RX ORDER — MODIFIED LANOLIN
OINTMENT (GRAM) TOPICAL
Status: DISCONTINUED | OUTPATIENT
Start: 2024-05-29 | End: 2024-05-31 | Stop reason: HOSPADM

## 2024-05-29 RX ORDER — OXYTOCIN 10 [USP'U]/ML
10 INJECTION, SOLUTION INTRAMUSCULAR; INTRAVENOUS
Status: DISCONTINUED | OUTPATIENT
Start: 2024-05-29 | End: 2024-05-29 | Stop reason: HOSPADM

## 2024-05-29 RX ORDER — OXYTOCIN 10 [USP'U]/ML
10 INJECTION, SOLUTION INTRAMUSCULAR; INTRAVENOUS
Status: DISCONTINUED | OUTPATIENT
Start: 2024-05-29 | End: 2024-05-29

## 2024-05-29 RX ORDER — CARBOPROST TROMETHAMINE 250 UG/ML
250 INJECTION, SOLUTION INTRAMUSCULAR
Status: DISCONTINUED | OUTPATIENT
Start: 2024-05-29 | End: 2024-05-29 | Stop reason: HOSPADM

## 2024-05-29 RX ORDER — PROCHLORPERAZINE MALEATE 10 MG
10 TABLET ORAL EVERY 6 HOURS PRN
Status: DISCONTINUED | OUTPATIENT
Start: 2024-05-29 | End: 2024-05-29 | Stop reason: HOSPADM

## 2024-05-29 RX ORDER — FENTANYL CITRATE-0.9 % NACL/PF 10 MCG/ML
100 PLASTIC BAG, INJECTION (ML) INTRAVENOUS EVERY 5 MIN PRN
Status: DISCONTINUED | OUTPATIENT
Start: 2024-05-29 | End: 2024-05-29 | Stop reason: HOSPADM

## 2024-05-29 RX ORDER — ONDANSETRON 4 MG/1
4 TABLET, ORALLY DISINTEGRATING ORAL EVERY 6 HOURS PRN
Status: DISCONTINUED | OUTPATIENT
Start: 2024-05-29 | End: 2024-05-29 | Stop reason: HOSPADM

## 2024-05-29 RX ORDER — LIDOCAINE 40 MG/G
CREAM TOPICAL
Status: DISCONTINUED | OUTPATIENT
Start: 2024-05-29 | End: 2024-05-29 | Stop reason: HOSPADM

## 2024-05-29 RX ORDER — ONDANSETRON 2 MG/ML
4 INJECTION INTRAMUSCULAR; INTRAVENOUS EVERY 6 HOURS PRN
Status: DISCONTINUED | OUTPATIENT
Start: 2024-05-29 | End: 2024-05-29 | Stop reason: HOSPADM

## 2024-05-29 RX ORDER — ACETAMINOPHEN 325 MG/1
650 TABLET ORAL EVERY 4 HOURS PRN
Status: DISCONTINUED | OUTPATIENT
Start: 2024-05-29 | End: 2024-05-31 | Stop reason: HOSPADM

## 2024-05-29 RX ORDER — MISOPROSTOL 200 UG/1
800 TABLET ORAL
Status: DISCONTINUED | OUTPATIENT
Start: 2024-05-29 | End: 2024-05-31 | Stop reason: HOSPADM

## 2024-05-29 RX ORDER — CITRIC ACID/SODIUM CITRATE 334-500MG
30 SOLUTION, ORAL ORAL
Status: DISCONTINUED | OUTPATIENT
Start: 2024-05-29 | End: 2024-05-29 | Stop reason: HOSPADM

## 2024-05-29 RX ORDER — IBUPROFEN 800 MG/1
800 TABLET, FILM COATED ORAL
Status: COMPLETED | OUTPATIENT
Start: 2024-05-29 | End: 2024-05-29

## 2024-05-29 RX ORDER — LOPERAMIDE HCL 2 MG
4 CAPSULE ORAL
Status: DISCONTINUED | OUTPATIENT
Start: 2024-05-29 | End: 2024-05-29 | Stop reason: HOSPADM

## 2024-05-29 RX ORDER — DOCUSATE SODIUM 100 MG/1
100 CAPSULE, LIQUID FILLED ORAL DAILY
Status: DISCONTINUED | OUTPATIENT
Start: 2024-05-30 | End: 2024-05-31 | Stop reason: HOSPADM

## 2024-05-29 RX ORDER — LOPERAMIDE HCL 2 MG
4 CAPSULE ORAL
Status: DISCONTINUED | OUTPATIENT
Start: 2024-05-29 | End: 2024-05-31 | Stop reason: HOSPADM

## 2024-05-29 RX ORDER — METHYLERGONOVINE MALEATE 0.2 MG/ML
200 INJECTION INTRAVENOUS
Status: DISCONTINUED | OUTPATIENT
Start: 2024-05-29 | End: 2024-05-31 | Stop reason: HOSPADM

## 2024-05-29 RX ORDER — BISACODYL 10 MG
10 SUPPOSITORY, RECTAL RECTAL DAILY PRN
Status: DISCONTINUED | OUTPATIENT
Start: 2024-05-29 | End: 2024-05-29

## 2024-05-29 RX ORDER — METOCLOPRAMIDE 10 MG/1
10 TABLET ORAL EVERY 6 HOURS PRN
Status: DISCONTINUED | OUTPATIENT
Start: 2024-05-29 | End: 2024-05-29 | Stop reason: HOSPADM

## 2024-05-29 RX ORDER — CARBOPROST TROMETHAMINE 250 UG/ML
250 INJECTION, SOLUTION INTRAMUSCULAR
Status: DISCONTINUED | OUTPATIENT
Start: 2024-05-29 | End: 2024-05-31 | Stop reason: HOSPADM

## 2024-05-29 RX ORDER — NALBUPHINE HYDROCHLORIDE 20 MG/ML
2.5-5 INJECTION, SOLUTION INTRAMUSCULAR; INTRAVENOUS; SUBCUTANEOUS EVERY 6 HOURS PRN
Status: DISCONTINUED | OUTPATIENT
Start: 2024-05-29 | End: 2024-05-31 | Stop reason: HOSPADM

## 2024-05-29 RX ORDER — METOCLOPRAMIDE HYDROCHLORIDE 5 MG/ML
10 INJECTION INTRAMUSCULAR; INTRAVENOUS EVERY 6 HOURS PRN
Status: DISCONTINUED | OUTPATIENT
Start: 2024-05-29 | End: 2024-05-29 | Stop reason: HOSPADM

## 2024-05-29 RX ORDER — OXYTOCIN/0.9 % SODIUM CHLORIDE 30/500 ML
340 PLASTIC BAG, INJECTION (ML) INTRAVENOUS CONTINUOUS PRN
Status: DISCONTINUED | OUTPATIENT
Start: 2024-05-29 | End: 2024-05-31 | Stop reason: HOSPADM

## 2024-05-29 RX ORDER — LOPERAMIDE HCL 2 MG
2 CAPSULE ORAL
Status: DISCONTINUED | OUTPATIENT
Start: 2024-05-29 | End: 2024-05-29 | Stop reason: HOSPADM

## 2024-05-29 RX ORDER — PROCHLORPERAZINE 25 MG
25 SUPPOSITORY, RECTAL RECTAL EVERY 12 HOURS PRN
Status: DISCONTINUED | OUTPATIENT
Start: 2024-05-29 | End: 2024-05-29 | Stop reason: HOSPADM

## 2024-05-29 RX ADMIN — LIDOCAINE HYDROCHLORIDE 20 ML: 10 INJECTION, SOLUTION INFILTRATION; PERINEURAL at 22:20

## 2024-05-29 RX ADMIN — KETOROLAC TROMETHAMINE 30 MG: 30 INJECTION, SOLUTION INTRAMUSCULAR at 22:20

## 2024-05-29 RX ADMIN — Medication: at 21:31

## 2024-05-29 RX ADMIN — ACETAMINOPHEN 650 MG: 325 TABLET ORAL at 23:27

## 2024-05-29 RX ADMIN — Medication 9 ML: at 21:43

## 2024-05-29 RX ADMIN — SODIUM CHLORIDE, POTASSIUM CHLORIDE, SODIUM LACTATE AND CALCIUM CHLORIDE 1000 ML: 600; 310; 30; 20 INJECTION, SOLUTION INTRAVENOUS at 20:49

## 2024-05-29 ASSESSMENT — ACTIVITIES OF DAILY LIVING (ADL)
ADLS_ACUITY_SCORE: 18
ADLS_ACUITY_SCORE: 18
ADLS_ACUITY_SCORE: 35
ADLS_ACUITY_SCORE: 18
ADLS_ACUITY_SCORE: 18

## 2024-05-30 LAB — T PALLIDUM AB SER QL: NONREACTIVE

## 2024-05-30 PROCEDURE — 120N000001 HC R&B MED SURG/OB

## 2024-05-30 PROCEDURE — 99231 SBSQ HOSP IP/OBS SF/LOW 25: CPT | Performed by: FAMILY MEDICINE

## 2024-05-30 PROCEDURE — 250N000013 HC RX MED GY IP 250 OP 250 PS 637: Performed by: FAMILY MEDICINE

## 2024-05-30 RX ADMIN — IBUPROFEN 800 MG: 800 TABLET ORAL at 22:33

## 2024-05-30 RX ADMIN — ACETAMINOPHEN 650 MG: 325 TABLET ORAL at 22:33

## 2024-05-30 RX ADMIN — DOCUSATE SODIUM 100 MG: 100 CAPSULE, LIQUID FILLED ORAL at 09:11

## 2024-05-30 RX ADMIN — ACETAMINOPHEN 650 MG: 325 TABLET ORAL at 14:53

## 2024-05-30 RX ADMIN — IBUPROFEN 800 MG: 800 TABLET ORAL at 04:33

## 2024-05-30 RX ADMIN — IBUPROFEN 800 MG: 800 TABLET ORAL at 14:53

## 2024-05-30 RX ADMIN — ACETAMINOPHEN 650 MG: 325 TABLET ORAL at 04:33

## 2024-05-30 ASSESSMENT — ACTIVITIES OF DAILY LIVING (ADL)
ADLS_ACUITY_SCORE: 19
ADLS_ACUITY_SCORE: 18
ADLS_ACUITY_SCORE: 19
ADLS_ACUITY_SCORE: 18
ADLS_ACUITY_SCORE: 19
ADLS_ACUITY_SCORE: 18
ADLS_ACUITY_SCORE: 19
ADLS_ACUITY_SCORE: 18
ADLS_ACUITY_SCORE: 18
ADLS_ACUITY_SCORE: 19
ADLS_ACUITY_SCORE: 19
ADLS_ACUITY_SCORE: 18
ADLS_ACUITY_SCORE: 18
ADLS_ACUITY_SCORE: 19
ADLS_ACUITY_SCORE: 19
ADLS_ACUITY_SCORE: 18

## 2024-05-30 NOTE — ANESTHESIA PROCEDURE NOTES
Epidural catheter Procedure Note    Pre-Procedure   Staff -        Anesthesiologist:  Stefan Leong DO       Performed By: CRNA       Location: OB       Procedure Start/Stop Times: 5/29/2024 9:25 PM and 5/29/2024 9:42 PM       Pre-Anesthestic Checklist: patient identified, IV checked, risks and benefits discussed, informed consent, monitors and equipment checked, pre-op evaluation, at physician/surgeon's request and post-op pain management  Timeout:       Correct Patient: Yes        Correct Procedure: Yes        Correct Site: Yes        Correct Position: Yes   Procedure Documentation  Procedure: epidural catheter       Patient Position: sitting       Patient Prep/Sterile Barriers: sterile gloves, mask, patient draped       Skin prep: Betadine and Chloraprep       Local skin infiltrated with mL of 1% lidocaine.        Insertion Site: L2-3. (midline approach).       Technique: LORT saline and LORT air        NARESH at 6 cm.       Needle Type: [employer] needle       Needle Gauge: 17.        Needle Length (Inches): 3.5        Catheter: 20 G.          Catheter threaded easily.           Threaded 11 cm at skin.         # of attempts: 1 and  # of redirects:  0    Assessment/Narrative         Paresthesias: No.       Test dose of 3 mL lidocaine 1.5% w/ 1:200,000 epinephrine at 21:40 CDT.        .       Insertion/Infusion Method: LORT saline and LORT air       Aspiration negative for Heme or CSF via Epidural Catheter.    Medication(s) Administered   Medication Administration Time: 5/29/2024 9:25 PM     Comments:  Epi   R/b/a discussed, patient agrees to have an epidural catheter placement.   Local infiltration of skin, advancement of needle without paresthesias, excellent Naresh to NS.   Catheter advanced without resistance nor paresthesias, negative aspiration for heme or CSF.  Remaining 2 mL epidural test dose diluted with 3 mL sterile water and given via epidural bolus.  Patient tolerated the procedure well, all  "questions answered.        FOR [facility] ONLY:   Pain Team Contact information: please page the Pain Team Via [employer]. Search \"Pain\". During daytime hours, please page the attending first. At night please page the resident first.      "

## 2024-05-30 NOTE — PROGRESS NOTES
Pt was sounding very uncomfortable, so RN went to assess.  Pt states that this is the same level of discomfort as it was with previous triage RN.  No sudden change, just uncomfortable. RN discussed plan to reexamine cervix after 1hr and pt agreed this was still reasonable.

## 2024-05-30 NOTE — ANESTHESIA POSTPROCEDURE EVALUATION
Patient: Lawrence Crooks    Procedure: * No procedures listed *       Anesthesia Type:  Epidural    Note:     Postop Pain Control: Uneventful            Sign Out: Well controlled pain   PONV: No   Neuro/Psych: Uneventful            Sign Out: Acceptable/Baseline neuro status   Airway/Respiratory: Uneventful            Sign Out: Acceptable/Baseline resp. status   CV/Hemodynamics: Uneventful            Sign Out: Acceptable CV status; No obvious hypovolemia; No obvious fluid overload   Other NRE: NONE   DID A NON-ROUTINE EVENT OCCUR? No           Last vitals:  Vitals:    05/30/24 0430 05/30/24 0908 05/30/24 1250   BP: 125/69 117/74 130/75   Pulse: 95 81 100   Resp: 16 18 16   Temp: 36.8  C (98.3  F) 36.9  C (98.4  F) 36.6  C (97.8  F)   SpO2:  99% 100%       Electronically Signed By: Chito Langley MD  May 30, 2024  1:57 PM

## 2024-05-30 NOTE — H&P
OB Admission H&P  Date: 2024  Time: 8:33 PM  Yadira Duffy,  1990, MRN [id number]    CC: contractions     HPI: Yadira Duffy is a 33 year old year old  at 39w3d weeks by 1st tri US presenting with contractions. She denies leakage of fluid and vaginal bleeding and reports good fetal movement.    Pregnancy complicated by hx c/section, has had subsequent successful     ROS: She denies fevers, chills, chest pain, shortness of breath, nausea, vomiting, or dysuria    OB History  OB History    Para Term  AB Living   3 2 2 0 0 1   SAB IAB Ectopic Multiple Live Births   0 0 0 0 1      # Outcome Date GA Lbr Tien/2nd Weight Sex Type Anes PTL Lv   3 Current            2 Term 19 40w4d  3.544 kg (7 lb 13 oz) F Vag-Spont         Name: BG YADIRA DUFFY      Apgar1: 7  Apgar5: 9   1 Term 2018    F CS-Unspec   SABRINA      Complications: Failure to Progress in First Stage   1st pregnancy: in [place], thinks was failure to progress, around 7 lbs - 5 days before (39+2 weeks), started with contractions  2nd pregnancy:  successful, healthy pregnancy    Medical History:  History reviewed. No pertinent past medical history.    Surgical History:   has a past surgical history that includes  section.    Social History  Reviewed, and she  reports that she has never smoked. She has never been exposed to tobacco smoke. She has never used smokeless tobacco. She reports that she does not drink alcohol and does not use drugs.    Family History:    No Known Allergies    Medications Prior to Admission   Medication Sig Dispense Refill Last Dose    Prenatal Vit-Fe Fumarate-FA (PRENATAL MULTIVITAMIN W/IRON) 27-0.8 MG tablet Take 1 tablet by mouth daily 90 tablet 3        Physical Exam:  Temp:  [98  F (36.7  C)] 98  F (36.7  C)  Pulse:  [85] 85  Resp:  [20] 20  BP: (125)/(66) 125/66    /66 (BP Location: Left arm, Patient Position: Right side, Cuff Size: Adult Regular)   Pulse  "85   Temp 98  F (36.7  C) (Oral)   Resp 20   Ht 1.651 m (5' 5\")   Wt 86.6 kg (191 lb)   LMP 09/02/2023 (Approximate)   BMI 31.78 kg/m      Gen: no acute distress, uncomfortable with contractions  CV: acyanotic   Pulm: unlabored respirations   Abd: gravid, soft, nontender   Extremities: soft, nontender    Cervical Exam: 3/80/-2 on nurse's check  FHR: 150, mod get, +accels, -decels  [place]:  q5min    Pertinent Labs  Prenatal Office Visit on 05/10/2024   Component Date Value Ref Range Status    Group B Strep PCR 05/10/2024 Negative  Negative Final    Presumed negative for Streptococcus agalactiae (Group B Streptococcus) or the number of organisms may be below the limit of detection of the assay.   Prenatal Office Visit on 04/19/2024   Component Date Value Ref Range Status    Glu Gest Screen 1hr 50g 04/19/2024 111  70 - 129 mg/dL Final    WBC Count 04/19/2024 7.7  4.0 - 11.0 10e3/uL Final    RBC Count 04/19/2024 4.14  3.80 - 5.20 10e6/uL Final    Hemoglobin 04/19/2024 11.9  11.7 - 15.7 g/dL Final    Hematocrit 04/19/2024 35.4  35.0 - 47.0 % Final    MCV 04/19/2024 86  78 - 100 fL Final    MCH 04/19/2024 28.7  26.5 - 33.0 pg Final    MCHC 04/19/2024 33.6  31.5 - 36.5 g/dL Final    RDW 04/19/2024 12.5  10.0 - 15.0 % Final    Platelet Count 04/19/2024 309  150 - 450 10e3/uL Final    Treponema Antibody Total 04/19/2024 Nonreactive  Nonreactive Final   Prenatal Office Visit on 12/12/2023   Component Date Value Ref Range Status    Hepatitis B Surface Antigen 12/12/2023 Nonreactive  Nonreactive Final    HIV Antigen Antibody Combo 12/12/2023 Nonreactive  Nonreactive Final    HIV-1 p24 Ag & HIV-1/HIV-2 Ab Not Detected    Rubella Katherine IgG Instrument Value 12/12/2023 5.18  <0.90 Index Final    Rubella Antibody IgG 12/12/2023 Positive   Final    Suggests previous exposure or immunization and probable immunity.    Treponema Antibody Total 12/12/2023 Nonreactive  Nonreactive Final    Culture 12/12/2023 No Growth   Final    " Chlamydia trachomatis 12/12/2023 Negative  Negative Final    A negative result by transcription mediated amplification does not preclude the presence of C. trachomatis infection because results are dependent on proper and adequate collection, absence of inhibitors and sufficient rRNA to be detected.    Hepatitis B Surface Antibody Instr* 12/12/2023 >1,000.00  <8.00 m[IU]/mL Final    Hepatitis B Surface Antibody 12/12/2023 Reactive   Final    Patient is considered to be immune to infection with hepatitis B when the value is greater than or equal to 12.00 mIU/mL.    Neisseria gonorrhoeae 12/12/2023 Negative  Negative Final    Negative for N. gonorrhoeae rRNA by transcription mediated amplification. A negative result by transcription mediated amplification does not preclude the presence of C. trachomatis infection because results are dependent on proper and adequate collection, absence of inhibitors and sufficient rRNA to be detected.    Color Urine 12/12/2023 Yellow  Colorless, Straw, Light Yellow, Yellow Final    Appearance Urine 12/12/2023 Clear  Clear Final    Glucose Urine 12/12/2023 Negative  Negative mg/dL Final    Bilirubin Urine 12/12/2023 Negative  Negative Final    Ketones Urine 12/12/2023 Negative  Negative mg/dL Final    Specific Gravity Urine 12/12/2023 1.025  1.005 - 1.030 Final    Blood Urine 12/12/2023 Negative  Negative Final    pH Urine 12/12/2023 7.0  5.0 - 8.0 Final    Protein Albumin Urine 12/12/2023 Negative  Negative mg/dL Final    Urobilinogen Urine 12/12/2023 0.2  0.2, 1.0 E.U./dL Final    Nitrite Urine 12/12/2023 Negative  Negative Final    Leukocyte Esterase Urine 12/12/2023 Trace (A)  Negative Final    Vitamin D, Total (25-Hydroxy) 12/12/2023 33  20 - 50 ng/mL Final    optimum levels    Hepatitis C Antibody 12/12/2023 Nonreactive  Nonreactive Final    WBC Count 12/12/2023 7.8  4.0 - 11.0 10e3/uL Final    RBC Count 12/12/2023 3.97  3.80 - 5.20 10e6/uL Final    Hemoglobin 12/12/2023 11.4 (L)   11.7 - 15.7 g/dL Final    Hematocrit 12/12/2023 33.1 (L)  35.0 - 47.0 % Final    MCV 12/12/2023 83  78 - 100 fL Final    MCH 12/12/2023 28.7  26.5 - 33.0 pg Final    MCHC 12/12/2023 34.4  31.5 - 36.5 g/dL Final    RDW 12/12/2023 14.0  10.0 - 15.0 % Final    Platelet Count 12/12/2023 337  150 - 450 10e3/uL Final    See Scanned Result 12/12/2023 INVITAE NON-INVASIVE PRENATAL SCREENING-Scanned   Final   Admission on 10/29/2023, Discharged on 10/29/2023   Component Date Value Ref Range Status    Sodium 10/29/2023 135  135 - 145 mmol/L Final    Reference intervals for this test were updated on 09/26/2023 to more accurately reflect our healthy population. There may be differences in the flagging of prior results with similar values performed with this method. Interpretation of those prior results can be made in the context of the updated reference intervals.     Potassium 10/29/2023 4.2  3.4 - 5.3 mmol/L Final    Carbon Dioxide (CO2) 10/29/2023 21 (L)  22 - 29 mmol/L Final    Anion Gap 10/29/2023 9  7 - 15 mmol/L Final    Urea Nitrogen 10/29/2023 3.7 (L)  6.0 - 20.0 mg/dL Final    Creatinine 10/29/2023 0.50 (L)  0.51 - 0.95 mg/dL Final    GFR Estimate 10/29/2023 >90  >60 mL/min/1.73m2 Final    Calcium 10/29/2023 9.5  8.6 - 10.0 mg/dL Final    Chloride 10/29/2023 105  98 - 107 mmol/L Final    Glucose 10/29/2023 86  70 - 99 mg/dL Final    Alkaline Phosphatase 10/29/2023 63  35 - 104 U/L Final    AST 10/29/2023 31  0 - 45 U/L Final    Reference intervals for this test were updated on 6/12/2023 to more accurately reflect our healthy population. There may be differences in the flagging of prior results with similar values performed with this method. Interpretation of those prior results can be made in the context of the updated reference intervals.    ALT 10/29/2023 41  0 - 50 U/L Final    Reference intervals for this test were updated on 6/12/2023 to more accurately reflect our healthy population. There may be differences in  the flagging of prior results with similar values performed with this method. Interpretation of those prior results can be made in the context of the updated reference intervals.      Protein Total 10/29/2023 7.6  6.4 - 8.3 g/dL Final    Albumin 10/29/2023 4.0  3.5 - 5.2 g/dL Final    Bilirubin Total 10/29/2023 0.3  <=1.2 mg/dL Final    Lipase 10/29/2023 33  13 - 60 U/L Final    Color Urine 10/29/2023 Light Yellow  Colorless, Straw, Light Yellow, Yellow Final    Appearance Urine 10/29/2023 Clear  Clear Final    Glucose Urine 10/29/2023 Negative  Negative mg/dL Final    Bilirubin Urine 10/29/2023 Negative  Negative Final    Ketones Urine 10/29/2023 Negative  Negative mg/dL Final    Specific Gravity Urine 10/29/2023 1.012  1.001 - 1.030 Final    Blood Urine 10/29/2023 Negative  Negative Final    pH Urine 10/29/2023 6.0  5.0 - 7.0 Final    Protein Albumin Urine 10/29/2023 Negative  Negative mg/dL Final    Urobilinogen Urine 10/29/2023 <2.0  <2.0 mg/dL Final    Nitrite Urine 10/29/2023 Negative  Negative Final    Leukocyte Esterase Urine 10/29/2023 75 Juliane/uL (A)  Negative Final    Mucus Urine 10/29/2023 Present (A)  None Seen /LPF Final    RBC Urine 10/29/2023 <1  <=2 /HPF Final    WBC Urine 10/29/2023 1  <=5 /HPF Final    Squamous Epithelials Urine 10/29/2023 3 (H)  <=1 /HPF Final    hCG Quantitative 10/29/2023 96,566 (H)  <5 mIU/mL Final    Adult: 0-5 mIU/mL for healthy non-pregnant person  Neonates: Should be within normal ranges by 2 days after birth      WBC Count 10/29/2023 6.7  4.0 - 11.0 10e3/uL Final    RBC Count 10/29/2023 4.18  3.80 - 5.20 10e6/uL Final    Hemoglobin 10/29/2023 11.4 (L)  11.7 - 15.7 g/dL Final    Hematocrit 10/29/2023 35.1  35.0 - 47.0 % Final    MCV 10/29/2023 84  78 - 100 fL Final    MCH 10/29/2023 27.3  26.5 - 33.0 pg Final    MCHC 10/29/2023 32.5  31.5 - 36.5 g/dL Final    RDW 10/29/2023 14.5  10.0 - 15.0 % Final    Platelet Count 10/29/2023 420  150 - 450 10e3/uL Final    % Neutrophils  10/29/2023 58  % Final    % Lymphocytes 10/29/2023 30  % Final    % Monocytes 10/29/2023 9  % Final    % Eosinophils 10/29/2023 2  % Final    % Basophils 10/29/2023 1  % Final    % Immature Granulocytes 10/29/2023 0  % Final    NRBCs per 100 WBC 10/29/2023 0  <1 /100 Final    Absolute Neutrophils 10/29/2023 4.0  1.6 - 8.3 10e3/uL Final    Absolute Lymphocytes 10/29/2023 2.0  0.8 - 5.3 10e3/uL Final    Absolute Monocytes 10/29/2023 0.6  0.0 - 1.3 10e3/uL Final    Absolute Eosinophils 10/29/2023 0.1  0.0 - 0.7 10e3/uL Final    Absolute Basophils 10/29/2023 0.0  0.0 - 0.2 10e3/uL Final    Absolute Immature Granulocytes 10/29/2023 0.0  <=0.4 10e3/uL Final    Absolute NRBCs 10/29/2023 0.0  10e3/uL Final    ABO/RH(D) 10/29/2023 O POS   Final    Antibody Screen 10/29/2023 Negative  Negative Final    SPECIMEN EXPIRATION DATE 10/29/2023 [id number]   Final       Prenatal Labs  Blood type: O pos  GBS neg   Rubella immune, Hep B negative, HIV negative, RPR non-reactive   GC / CT negative   1 hr GCT pass     Impression:  Lawrence Crooks is a 33 year old year old  at 39w3d weeks admitted for active labor, pregnancy complicated by hx /planning TOLAC/. Fetal status reassuring     Plan:  1. Active Labor   TOLAC    Conor regularly   Desires epidural    Anticipate   2. Fetal status   cEFM w/toco    Brooke Young MD  [facility]

## 2024-05-30 NOTE — PROGRESS NOTES
Data: Patient presented to [facility]: 2024  6:57 PM.  Reason for maternal/fetal assessment is uterine contractions. Patient reports Contractions every 5 minutes all day. Patient denies leaking of vaginal fluid/rupture of membranes, vaginal bleeding, abdominal pain, pelvic pressure, nausea, vomiting, headache, visual disturbances, epigastric or RUQ pain, significant edema. Patient reports fetal movement is normal. Patient is a 39w3d .  Prenatal record reviewed. Pregnancy has been uncomplicated.    Vital signs wnl. Support person is present.     Action: Verbal consent for EFM. Triage assessment completed.     Response: Patient verbalized agreement with plan. Will contact Dr. Young with update and further orders.

## 2024-05-30 NOTE — ANESTHESIA PREPROCEDURE EVALUATION
"Anesthesia Pre-Procedure Evaluation    Patient: Lawrence Crooks   MRN: [id number] : 1990        Procedure : * No procedures listed *          History reviewed. No pertinent past medical history.   Past Surgical History:   Procedure Laterality Date      SECTION      2018      No Known Allergies   Social History     Tobacco Use     Smoking status: Never     Passive exposure: Never     Smokeless tobacco: Never   Substance Use Topics     Alcohol use: Never      Wt Readings from Last 1 Encounters:   24 86.6 kg (191 lb)        Anesthesia Evaluation            ROS/MED HX  ENT/Pulmonary:  - neg pulmonary ROS     Neurologic:  - neg neurologic ROS     Cardiovascular:  - neg cardiovascular ROS     METS/Exercise Tolerance:     Hematologic:       Musculoskeletal:       GI/Hepatic:  - neg GI/hepatic ROS     Renal/Genitourinary:  - neg Renal ROS     Endo:  - neg endo ROS     Psychiatric/Substance Use:       Infectious Disease:       Malignancy:       Other:          Physical Exam    Airway        Mallampati: II   TM distance: > 3 FB   Neck ROM: full     Respiratory Devices and Support         Dental       (+) Completely normal teeth      Cardiovascular   cardiovascular exam normal          Pulmonary   pulmonary exam normal            OUTSIDE LABS:  CBC:   Lab Results   Component Value Date    WBC 7.7 2024    WBC 7.8 2023    HGB 12.8 2024    HGB 11.9 2024    HCT 35.4 2024    HCT 33.1 (L) 2023     2024     2024     BMP:   Lab Results   Component Value Date     10/29/2023     2023    POTASSIUM 4.2 10/29/2023    POTASSIUM 3.9 2023    CHLORIDE 105 10/29/2023    CHLORIDE 107 2023    CO2 21 (L) 10/29/2023    CO2 20 (L) 2023    BUN 3.7 (L) 10/29/2023    BUN 12.0 2023    CR 0.50 (L) 10/29/2023    CR 0.59 2023    GLC 86 10/29/2023    GLC 81 2023     COAGS: No results found for: \"PTT\", \"INR\", " "\"FIBR\"  POC: No results found for: \"BGM\", \"HCG\", \"HCGS\"  HEPATIC:   Lab Results   Component Value Date    ALBUMIN 4.0 10/29/2023    PROTTOTAL 7.6 10/29/2023    ALT 41 10/29/2023    AST 31 10/29/2023    ALKPHOS 63 10/29/2023    BILITOTAL 0.3 10/29/2023     OTHER:   Lab Results   Component Value Date    SAAD 9.5 10/29/2023    LIPASE 33 10/29/2023       Anesthesia Plan    ASA Status:  2       Anesthesia Type: Epidural.              Consents    Anesthesia Plan(s) and associated risks, benefits, and realistic alternatives discussed. Questions answered and patient/representative(s) expressed understanding.     - Discussed: Risks, Benefits and Alternatives for the PROCEDURE were discussed     - Discussed with:  Patient            Postoperative Care            Comments:             Stefan Leong, DO    I have reviewed the pertinent notes and labs in the chart from the past 30 days and (re)examined the patient.  Any updates or changes from those notes are reflected in this note.                  "

## 2024-05-30 NOTE — PLAN OF CARE
Goal Outcome Evaluation:       Pt bonding with baby and vitals stable. Her fundus is midline and firm without massage and bleeding is scant. Few small clots reported by patient. Her pain is well managed with tylenol and ibuprofen. She is breastfeeding baby and supplementing formula.     Problem: Postpartum (Vaginal Delivery)  Goal: Optimal Pain Control and Function  Outcome: Progressing  Intervention: Prevent or Manage Pain  Recent Flowsheet Documentation  Taken 5/30/2024 0908 by Sravani Hathaway RN  Pain Management Interventions: medication (see MAR)     Problem: Postpartum (Vaginal Delivery)  Goal: Effective Urinary Elimination  Outcome: Progressing     Problem: Postpartum (Vaginal Delivery)  Goal: Absence of Infection Signs and Symptoms  Outcome: Progressing     Problem: Postpartum (Vaginal Delivery)  Goal: Successful Parent Role Transition  Outcome: Progressing

## 2024-05-30 NOTE — L&D DELIVERY NOTE
VAGINAL DELIVERY NOTE    PRE DELIVERY DIAGNOSIS  1) Intrauterine pregnancy at 39w3d   2) Previous  section, desires TOLAC      POST DELIVERY DIAGNOSIS  1) Intrauterine pregnancy at 39w3d   2) Delivery of a viable male.    Delivery Method/Type:       PROVIDER:   Yas Jacobson MD     ANESTHESIA: Epidural and local    COMPLICATIONS: None    DESCRIPTION OF PROCEDURE  Lawrence Crooks is a 33 year old  with prenatal care with Dr. Young who was admitted at 39w3d  for labor.  Patient had a   Delayed cord clamping performed.  No gross fetal defects were observed. Pitocin was administered. Placenta delivered intact with 3 vessel cord. The perineum was then evaluated and noted to have no lacerations.  There was a left labial laceration that was repaired with 3/0 Vicryl in the usual sterile fashion. Delivery QBL (mL): 100     Yas Jacobson MD

## 2024-05-30 NOTE — PROGRESS NOTES
RN pulled fentaNYL (SUBLIMAZE) 2 mcg/mL, ROPivacaine (NAROPIN) 0.1% in NaCl 0.9% for PIB + PCEA PREMIX for an epidural placement for the primary nurse, Jacki Mckinney RN. Writer brought in med and set up pump in preparation of administration. Jacki and MARISSA will sign off on medication administration.

## 2024-05-30 NOTE — PROGRESS NOTES
OB Progress Note    Spoke with RN at 8:27pm - pt 4/80/-3, becky every 5 mins and unruptured - told pt moving to room and plan to get epidural, discussed I am 10 mins away; next call at 9:45pm and pt delivering, sounds like RN thought I was already here - apparently a miscommunication occurred. I arrived 3 mins after baby's delivery at 9:54pm. In house OB present for delivery and completed repair - see her note; baby crying at warmer upon my arrival.     Dr Young

## 2024-05-30 NOTE — PLAN OF CARE
Problem: Postpartum (Vaginal Delivery)  Goal: Optimal Pain Control and Function  Outcome: Progressing   Problem: Labor  Goal: Effective Progression to Delivery  Outcome: Met   Problem: Postpartum (Vaginal Delivery)  Goal: Effective Urinary Elimination  Outcome: Progressing     Pt delivered infant vaginally on 5/29/24 at 2151.  mL. Pt resting since delivery. Vitals stable, up x1 with standby assist, voided x1 since delivery. Denies preeclampsia symptoms, Fundus firm, midline, scant bleeding. Pt managing pain with Tylenol and Toradol. Bonding well with infant.    Jacki Mckinney RN

## 2024-05-30 NOTE — PROGRESS NOTES
[facility]    Post-Partum Progress Note    Assessment & Plan   Assessment:  Post-partum day #1  Normal spontaneous vaginal delivery    Doing well.  Pain well-controlled.    Plan:  Ambulation encouraged  Breast feeding strategies discussed  Anticipate discharge tomorrow    Bethany Mae MD     Interval History   Doing well.  Pain is adequately controlled.  No fevers.  No history of foul-smelling vaginal discharge.  Good appetite.  Denies chest pain, shortness of breath, nausea or vomiting.  Vaginal bleeding is similar to a heavy menstrual flow.  Breastfeeding well.    Medications   Current Facility-Administered Medications   Medication Dose Route Frequency Provider Last Rate Last Admin    No MMR Needed - Assessment: Patient does not need MMR vaccine   Does not apply Continuous PRN Brooke Young MD        No Tdap Needed - Assessment: Patient does not need Tdap vaccine   Does not apply Continuous PRN Brooke Young MD        oxytocin (PITOCIN) 30 units in 500 mL 0.9% NaCl infusion  100-340 mL/hr Intravenous Continuous PRN Brooke Young  mL/hr at 05/29/24 2220 340 mL/hr at 05/29/24 2220    oxytocin (PITOCIN) 30 units in 500 mL 0.9% NaCl infusion  340 mL/hr Intravenous Continuous PRN Brooke Young MD         Current Facility-Administered Medications   Medication Dose Route Frequency Provider Last Rate Last Admin    docusate sodium (COLACE) capsule 100 mg  100 mg Oral Daily Brooke Young MD   100 mg at 05/30/24 0911       Physical Exam   Temp: 98.4  F (36.9  C) Temp src: Oral BP: 117/74 Pulse: 81   Resp: 18 SpO2: 99 % O2 Device: None (Room air)    Vitals:    05/29/24 1919   Weight: 86.6 kg (191 lb)     Vital Signs with Ranges  Temp:  [98  F (36.7  C)-99  F (37.2  C)] 98.4  F (36.9  C)  Pulse:  [81-95] 81  Resp:  [16-20] 18  BP: (108-149)/(53-95) 117/74  SpO2:  [99 %-100 %] 99 %  I/O last 3 completed shifts:  In: -   Out: 348  [Blood:348]    Uterine fundus is firm, non-tender and at the level of the umbilicus  Extremities Non-tender  Heart is regular rate and rhythm and lungs clear to auscultation    Data   Recent Labs   Lab Test 05/29/24 2044   AS Negative     Recent Labs   Lab Test 05/29/24 2044 04/19/24  1257   HGB 12.8 11.9     Recent Labs   Lab Test 12/12/23  1500   RUQIGG Positive

## 2024-05-31 VITALS
HEART RATE: 80 BPM | WEIGHT: 184.13 LBS | RESPIRATION RATE: 16 BRPM | OXYGEN SATURATION: 100 % | SYSTOLIC BLOOD PRESSURE: 110 MMHG | HEIGHT: 65 IN | DIASTOLIC BLOOD PRESSURE: 60 MMHG | BODY MASS INDEX: 30.68 KG/M2 | TEMPERATURE: 98.1 F

## 2024-05-31 PROCEDURE — 250N000013 HC RX MED GY IP 250 OP 250 PS 637: Performed by: FAMILY MEDICINE

## 2024-05-31 PROCEDURE — 99238 HOSP IP/OBS DSCHRG MGMT 30/<: CPT | Performed by: FAMILY MEDICINE

## 2024-05-31 RX ADMIN — DOCUSATE SODIUM 100 MG: 100 CAPSULE, LIQUID FILLED ORAL at 11:26

## 2024-05-31 RX ADMIN — IBUPROFEN 800 MG: 800 TABLET ORAL at 11:26

## 2024-05-31 RX ADMIN — IBUPROFEN 800 MG: 800 TABLET ORAL at 05:28

## 2024-05-31 RX ADMIN — ACETAMINOPHEN 650 MG: 325 TABLET ORAL at 05:28

## 2024-05-31 RX ADMIN — ACETAMINOPHEN 650 MG: 325 TABLET ORAL at 11:26

## 2024-05-31 ASSESSMENT — ACTIVITIES OF DAILY LIVING (ADL)
ADLS_ACUITY_SCORE: 18

## 2024-05-31 NOTE — PLAN OF CARE
Goal Outcome Evaluation:       Patient given and explained AVS. Questions answered and patient verbalized understanding. Discharged home with infant. Walked to front entryway with staff.

## 2024-05-31 NOTE — PROGRESS NOTES
[facility]    Post-Partum Progress Note    Assessment & Plan   Assessment:  Post-partum day #2  Normal spontaneous vaginal delivery    Doing well.    Plan:  Ambulation encouraged  Breast feeding strategies discussed  Pain control measures as needed  Discharge later today    Bethany Mae MD     Interval History   Doing well.  Pain is adequately controlled.  No fevers.  No history of foul-smelling vaginal discharge.  Good appetite.  Denies chest pain, shortness of breath, nausea or vomiting.  Vaginal bleeding is similar to a heavy menstrual flow.  Breastfeeding well.    Medications   Current Facility-Administered Medications   Medication Dose Route Frequency Provider Last Rate Last Admin    No MMR Needed - Assessment: Patient does not need MMR vaccine   Does not apply Continuous PRN Brooke Young MD        No Tdap Needed - Assessment: Patient does not need Tdap vaccine   Does not apply Continuous PRN Brooke Young MD        oxytocin (PITOCIN) 30 units in 500 mL 0.9% NaCl infusion  100-340 mL/hr Intravenous Continuous PRN Brooke Young  mL/hr at 05/29/24 2220 340 mL/hr at 05/29/24 2220    oxytocin (PITOCIN) 30 units in 500 mL 0.9% NaCl infusion  340 mL/hr Intravenous Continuous PRN Brooke Young MD         Current Facility-Administered Medications   Medication Dose Route Frequency Provider Last Rate Last Admin    docusate sodium (COLACE) capsule 100 mg  100 mg Oral Daily Brooke Young MD   100 mg at 05/30/24 0911       Physical Exam   Temp: 98.1  F (36.7  C) Temp src: Oral BP: 110/60 Pulse: 80   Resp: 16 SpO2: 100 % O2 Device: None (Room air)    Vitals:    05/29/24 1919 05/31/24 0515   Weight: 86.6 kg (191 lb) 83.5 kg (184 lb 2 oz)     Vital Signs with Ranges  Temp:  [97.8  F (36.6  C)-98.4  F (36.9  C)] 98.1  F (36.7  C)  Pulse:  [] 80  Resp:  [16-18] 16  BP: (110-130)/(55-80) 110/60  SpO2:  [99 %-100 %] 100 %  No  intake/output data recorded.    Uterine fundus is firm, non-tender and at the level of the umbilicus  Extremities Non-tender  Heart is regular rate and rhythm and lungs clear to auscultation    Data   Recent Labs   Lab Test 05/29/24 2044   AS Negative     Recent Labs   Lab Test 05/29/24 2044 04/19/24  1257   HGB 12.8 11.9     Recent Labs   Lab Test 12/12/23  1500   RUQIGG Positive

## 2024-05-31 NOTE — PLAN OF CARE
Problem: Postpartum (Vaginal Delivery)  Goal: Optimal Pain Control and Function  Outcome: Progressing   Problem: Postpartum (Vaginal Delivery)  Goal: Successful Parent Role Transition  Outcome: Progressing     Pt delivered infant vaginally on 5/29 at 2151.  mL. Pt rested overnight. Vitals stable, up ad joe, voiding spontaneously. Denies preeclampsia symptoms, Fundus firm, midline, scant bleeding. Pt managing pain with Tylenol and Ibuprofen. Bonding well with infant.    Jacki Mckinney RN

## 2024-06-03 ENCOUNTER — PATIENT OUTREACH (OUTPATIENT)
Dept: FAMILY MEDICINE | Facility: CLINIC | Age: 34
End: 2024-06-03
Payer: COMMERCIAL

## 2024-06-03 NOTE — TELEPHONE ENCOUNTER
Transitions of Care Outreach  Chief Complaint   Patient presents with    Hospital F/U       Most Recent Admission Date: 5/29/2024   Most Recent Admission Diagnosis:      Most Recent Discharge Date: 5/31/2024   Most Recent Discharge Diagnosis: Care and examination of lactating mother - Z39.1     Transitions of Care Assessment    Discharge Assessment  How are you doing now that you are home?: Patient is still having pain  How are your symptoms? (Red Flag symptoms escalate to triage hotline per guidelines): Unchanged  Do you know how to contact your clinic care team if you have future questions or changes to your health status? : No (phone number provided)  Does the patient have their discharge instructions? : Yes  Does the patient have questions regarding their discharge instructions? : No  Were you started on any new medications or were there changes to any of your previous medications? : No  Does the patient have all of their medications?: Yes  Do you have questions regarding any of your medications? : No  Do you have all of your needed medical supplies or equipment (DME)?  (i.e. oxygen tank, CPAP, cane, etc.): Yes    Follow up Plan     Discharge Follow-Up  Discharge follow up appointment scheduled in alignment with recommended follow up timeframe or Transitions of Risk Category? (Low = within 30 days; Moderate= within 14 days; High= within 7 days): Yes  Discharge Follow Up Appointment Date: 06/27/24  Discharge Follow Up Appointment Scheduled with?: Primary Care Provider    Future Appointments   Date Time Provider Department Center   6/27/2024 12:40 PM Brooke Young MD [facility]       Outpatient Plan as outlined on AVS reviewed with patient.    For any urgent concerns, please contact our 24 hour nurse triage line: [phone] ([phone])       Alix Juan RN

## 2024-06-24 PROBLEM — Z36.89 ENCOUNTER FOR TRIAGE IN PREGNANT PATIENT: Status: RESOLVED | Noted: 2024-05-29 | Resolved: 2024-06-24

## 2024-06-24 PROBLEM — O34.211 MATERNAL CARE DUE TO LOW TRANSVERSE UTERINE SCAR FROM PREVIOUS CESAREAN DELIVERY: Status: RESOLVED | Noted: 2019-10-28 | Resolved: 2024-06-24

## 2024-06-24 PROBLEM — O47.9 UTERINE CONTRACTIONS: Status: RESOLVED | Noted: 2024-05-29 | Resolved: 2024-06-24

## 2024-06-24 NOTE — PROGRESS NOTES
Clinic Note - Postpartum Visit    Yadira Duffy is a 33 year old  female presenting for routine postpartum follow up.    Date of delivery was  via  at 39+3 weeks  Complications noted during the pregnancy include hx .  Complications at the time of delivery include none.      The patient notes no worrisome bleeding since since the time of delivery.  She has no continued pain.  Patient is breast feeding.  Patient denies concerns with postpartum blues/depression - not sleeping well, has headaches.  Patient has not resumed sexual activity and is interested in contraception at this time, mirena IUD in 1 month or so.  Vaginal discharge and itching started 2 weeks ago. Last pap smear  - nml with pos other HPV    ROS:  General: No fevers.  Cardiac: No chest pain or palpitations.  Breasts: No redness, warmth, pain.   Pulmonary: No shortness of breath or respiratory distress.  GI: No abdominal pain.  Normal bowel habits, no incontinence.  : No dysuria, hematuria, no incontinence.  Extremities: No edema.    No Known Allergies    Current Outpatient Medications   Medication Sig Dispense Refill    Prenatal Vit-Fe Fumarate-FA (PRENATAL MULTIVITAMIN W/IRON) 27-0.8 MG tablet Take 1 tablet by mouth daily 90 tablet 3       History reviewed. No pertinent past medical history.    OB History    Para Term  AB Living   3 3 3 0 0 3   SAB IAB Ectopic Multiple Live Births   0 0 0 0 3      # Outcome Date GA Lbr Tien/2nd Weight Sex Type Anes PTL Lv   3 Term 24 39w3d / 00:09 3.75 kg (8 lb 4.3 oz) M Vag-Spont EPI, Local N SABRINA      Name: Alonzo Benson      Apgar1: 9  Apgar5: 9   2 Term 19 40w4d  3.544 kg (7 lb 13 oz) F Vag-Spont   SABRINA      Name: BG YADIRA DUFFY      Apgar1: 7  Apgar5: 9   1 Term 2018    F CS-LTranv   SABRINA      Complications: Failure to Progress in First Stage       /64 (BP Location: Right arm, Patient Position: Sitting, Cuff Size: Adult Regular)   Pulse  "81   Temp 97.7  F (36.5  C) (Temporal)   Resp 20   Ht 1.651 m (5' 5\")   Wt 78.2 kg (172 lb 6.4 oz)   LMP 09/02/2023 (Approximate)   SpO2 99%   Breastfeeding Yes   BMI 28.69 kg/m     General: no apparent distress, appears well  Cardiovascular: regular rate and rhythm without murmur  Pulmonary: clear to auscultation bilaterally without wheezing or crackles  Abdomen: Soft, non-tender.  No rebound or guarding.   GYN: uterus is normal non-gravid size and nontender, well healed vaginal wall and external genitalia - some suture material still present but discussed will dissolve soon, no significant discharge, normal appearing cervix  Lower extremity: no significant swelling    Assessment/Plan  Routine postpartum follow-up  Encounter for initial prescription of injectable contraceptive  Overall doing well and adjusting to new baby. Post partum depression has not been a concern though pt is struggling from lack of sleep - discussed tips to try to improve sleep. Desires depo for contraception at this time though sounds like might do mirena IUD in future. Recommended continuing prenatal vitamin during breastfeeding. Ok to resume normal activities without restriction.   - medroxyPROGESTERone (DEPO-PROVERA) injection 150 mg    Cervical cancer screening  Due for pap smear, cotesting completed today.  - Gynecologic Cytology (Pap) and HPV - Recommended Age 30-65 Years    Vaginal itching  Wet prep obtained to check for infection but no significant discharge on exam - discussed irritation/itching could be related to the dissolving suture material which should be gone in next 1-2 weeks.  - Wet prep - Clinic Collect      Brooke Young MD  [facility]     "

## 2024-06-27 ENCOUNTER — PRENATAL OFFICE VISIT (OUTPATIENT)
Dept: FAMILY MEDICINE | Facility: CLINIC | Age: 34
End: 2024-06-27
Payer: COMMERCIAL

## 2024-06-27 ENCOUNTER — MEDICAL CORRESPONDENCE (OUTPATIENT)
Dept: HEALTH INFORMATION MANAGEMENT | Facility: CLINIC | Age: 34
End: 2024-06-27

## 2024-06-27 VITALS
RESPIRATION RATE: 20 BRPM | OXYGEN SATURATION: 99 % | TEMPERATURE: 97.7 F | HEART RATE: 81 BPM | DIASTOLIC BLOOD PRESSURE: 64 MMHG | BODY MASS INDEX: 28.72 KG/M2 | HEIGHT: 65 IN | WEIGHT: 172.4 LBS | SYSTOLIC BLOOD PRESSURE: 112 MMHG

## 2024-06-27 DIAGNOSIS — N89.8 VAGINAL ITCHING: ICD-10-CM

## 2024-06-27 DIAGNOSIS — Z12.4 CERVICAL CANCER SCREENING: ICD-10-CM

## 2024-06-27 DIAGNOSIS — Z30.013 ENCOUNTER FOR INITIAL PRESCRIPTION OF INJECTABLE CONTRACEPTIVE: ICD-10-CM

## 2024-06-27 LAB
CLUE CELLS: ABNORMAL
TRICHOMONAS, WET PREP: ABNORMAL
WBC'S/HIGH POWER FIELD, WET PREP: ABNORMAL
YEAST, WET PREP: ABNORMAL

## 2024-06-27 PROCEDURE — 87624 HPV HI-RISK TYP POOLED RSLT: CPT | Performed by: FAMILY MEDICINE

## 2024-06-27 PROCEDURE — G0145 SCR C/V CYTO,THINLAYER,RESCR: HCPCS | Performed by: FAMILY MEDICINE

## 2024-06-27 PROCEDURE — 87210 SMEAR WET MOUNT SALINE/INK: CPT | Performed by: FAMILY MEDICINE

## 2024-06-27 PROCEDURE — 96372 THER/PROPH/DIAG INJ SC/IM: CPT | Performed by: FAMILY MEDICINE

## 2024-06-27 RX ORDER — MEDROXYPROGESTERONE ACETATE 150 MG/ML
150 INJECTION, SUSPENSION INTRAMUSCULAR
Status: ACTIVE | OUTPATIENT
Start: 2024-06-27 | End: 2025-06-22

## 2024-06-27 RX ADMIN — MEDROXYPROGESTERONE ACETATE 150 MG: 150 INJECTION, SUSPENSION INTRAMUSCULAR at 13:20

## 2024-06-27 ASSESSMENT — PAIN SCALES - GENERAL: PAINLEVEL: MODERATE PAIN (5)

## 2024-06-28 LAB
HPV HR 12 DNA CVX QL NAA+PROBE: NEGATIVE
HPV16 DNA CVX QL NAA+PROBE: NEGATIVE
HPV18 DNA CVX QL NAA+PROBE: NEGATIVE
HUMAN PAPILLOMA VIRUS FINAL DIAGNOSIS: NORMAL

## 2024-07-03 ENCOUNTER — PATIENT OUTREACH (OUTPATIENT)
Dept: FAMILY MEDICINE | Facility: CLINIC | Age: 34
End: 2024-07-03

## 2024-07-03 LAB
BKR LAB AP GYN ADEQUACY: NORMAL
BKR LAB AP GYN INTERPRETATION: NORMAL
BKR LAB AP PREVIOUS ABNORMAL: NORMAL
PATH REPORT.COMMENTS IMP SPEC: NORMAL
PATH REPORT.COMMENTS IMP SPEC: NORMAL
PATH REPORT.RELEVANT HX SPEC: NORMAL

## 2024-07-28 ENCOUNTER — HEALTH MAINTENANCE LETTER (OUTPATIENT)
Age: 34
End: 2024-07-28

## 2024-09-18 ENCOUNTER — MYC MEDICAL ADVICE (OUTPATIENT)
Dept: FAMILY MEDICINE | Facility: CLINIC | Age: 34
End: 2024-09-18
Payer: COMMERCIAL

## 2024-09-18 DIAGNOSIS — Z30.016 ENCOUNTER FOR INITIAL PRESCRIPTION OF TRANSDERMAL PATCH HORMONAL CONTRACEPTIVE DEVICE: Primary | ICD-10-CM

## 2024-09-19 RX ORDER — NORELGESTROMIN AND ETHINYL ESTRADIOL 35; 150 UG/MG; UG/MG
PATCH TRANSDERMAL
Qty: 12 PATCH | Refills: 3 | Status: SHIPPED | OUTPATIENT
Start: 2024-09-19

## 2024-11-26 ENCOUNTER — PATIENT OUTREACH (OUTPATIENT)
Dept: CARE COORDINATION | Facility: CLINIC | Age: 34
End: 2024-11-26
Payer: COMMERCIAL

## 2024-12-11 ENCOUNTER — MYC REFILL (OUTPATIENT)
Dept: FAMILY MEDICINE | Facility: CLINIC | Age: 34
End: 2024-12-11
Payer: COMMERCIAL

## 2024-12-11 DIAGNOSIS — Z30.016 ENCOUNTER FOR INITIAL PRESCRIPTION OF TRANSDERMAL PATCH HORMONAL CONTRACEPTIVE DEVICE: ICD-10-CM

## 2024-12-11 RX ORDER — NORELGESTROMIN AND ETHINYL ESTRADIOL 35; 150 UG/MG; UG/MG
PATCH TRANSDERMAL
Qty: 12 PATCH | Refills: 3 | Status: CANCELLED | OUTPATIENT
Start: 2024-12-11

## 2024-12-12 RX ORDER — NORELGESTROMIN AND ETHINYL ESTRADIOL 35; 150 UG/MG; UG/MG
PATCH TRANSDERMAL
Qty: 12 PATCH | Refills: 2 | Status: SHIPPED | OUTPATIENT
Start: 2024-12-12

## 2025-05-03 ENCOUNTER — MYC REFILL (OUTPATIENT)
Dept: FAMILY MEDICINE | Facility: CLINIC | Age: 35
End: 2025-05-03
Payer: COMMERCIAL

## 2025-05-03 DIAGNOSIS — Z30.016 ENCOUNTER FOR INITIAL PRESCRIPTION OF TRANSDERMAL PATCH HORMONAL CONTRACEPTIVE DEVICE: ICD-10-CM

## 2025-05-05 RX ORDER — NORELGESTROMIN AND ETHINYL ESTRADIOL 35; 150 UG/MG; UG/MG
PATCH TRANSDERMAL
Qty: 12 PATCH | Refills: 1 | Status: SHIPPED | OUTPATIENT
Start: 2025-05-05

## 2025-06-16 ENCOUNTER — PATIENT OUTREACH (OUTPATIENT)
Dept: FAMILY MEDICINE | Facility: CLINIC | Age: 35
End: 2025-06-16
Payer: COMMERCIAL

## 2025-08-10 ENCOUNTER — HEALTH MAINTENANCE LETTER (OUTPATIENT)
Age: 35
End: 2025-08-10